# Patient Record
Sex: MALE | Race: WHITE | URBAN - METROPOLITAN AREA
[De-identification: names, ages, dates, MRNs, and addresses within clinical notes are randomized per-mention and may not be internally consistent; named-entity substitution may affect disease eponyms.]

---

## 2017-02-07 ENCOUNTER — INPATIENT (INPATIENT)
Facility: HOSPITAL | Age: 62
LOS: 0 days | Discharge: HOME | DRG: 305 | End: 2017-02-08
Attending: FAMILY MEDICINE | Admitting: FAMILY MEDICINE
Payer: COMMERCIAL

## 2017-02-07 VITALS
HEART RATE: 70 BPM | OXYGEN SATURATION: 98 % | RESPIRATION RATE: 14 BRPM | TEMPERATURE: 98 F | DIASTOLIC BLOOD PRESSURE: 100 MMHG | HEIGHT: 66 IN | WEIGHT: 147.93 LBS | SYSTOLIC BLOOD PRESSURE: 152 MMHG

## 2017-02-07 DIAGNOSIS — Z98.890 OTHER SPECIFIED POSTPROCEDURAL STATES: Chronic | ICD-10-CM

## 2017-02-07 DIAGNOSIS — Z41.8 ENCOUNTER FOR OTHER PROCEDURES FOR PURPOSES OTHER THAN REMEDYING HEALTH STATE: ICD-10-CM

## 2017-02-07 DIAGNOSIS — E11.9 TYPE 2 DIABETES MELLITUS WITHOUT COMPLICATIONS: ICD-10-CM

## 2017-02-07 DIAGNOSIS — Z96.642 PRESENCE OF LEFT ARTIFICIAL HIP JOINT: Chronic | ICD-10-CM

## 2017-02-07 DIAGNOSIS — I10 ESSENTIAL (PRIMARY) HYPERTENSION: ICD-10-CM

## 2017-02-07 DIAGNOSIS — C71.9 MALIGNANT NEOPLASM OF BRAIN, UNSPECIFIED: ICD-10-CM

## 2017-02-07 DIAGNOSIS — R56.9 UNSPECIFIED CONVULSIONS: ICD-10-CM

## 2017-02-07 DIAGNOSIS — R41.82 ALTERED MENTAL STATUS, UNSPECIFIED: ICD-10-CM

## 2017-02-07 DIAGNOSIS — D49.6 NEOPLASM OF UNSPECIFIED BEHAVIOR OF BRAIN: Chronic | ICD-10-CM

## 2017-02-07 DIAGNOSIS — K21.9 GASTRO-ESOPHAGEAL REFLUX DISEASE WITHOUT ESOPHAGITIS: ICD-10-CM

## 2017-02-07 DIAGNOSIS — K59.00 CONSTIPATION, UNSPECIFIED: ICD-10-CM

## 2017-02-07 DIAGNOSIS — E78.5 HYPERLIPIDEMIA, UNSPECIFIED: ICD-10-CM

## 2017-02-07 DIAGNOSIS — F32.9 MAJOR DEPRESSIVE DISORDER, SINGLE EPISODE, UNSPECIFIED: ICD-10-CM

## 2017-02-07 LAB
ALBUMIN SERPL ELPH-MCNC: 3.4 G/DL — SIGNIFICANT CHANGE UP (ref 3.3–5)
ALP SERPL-CCNC: 104 U/L — SIGNIFICANT CHANGE UP (ref 40–120)
ALT FLD-CCNC: 27 U/L — SIGNIFICANT CHANGE UP (ref 12–78)
ANION GAP SERPL CALC-SCNC: 7 MMOL/L — SIGNIFICANT CHANGE UP (ref 5–17)
AST SERPL-CCNC: 16 U/L — SIGNIFICANT CHANGE UP (ref 15–37)
BILIRUB SERPL-MCNC: 0.3 MG/DL — SIGNIFICANT CHANGE UP (ref 0.2–1.2)
BUN SERPL-MCNC: 17 MG/DL — SIGNIFICANT CHANGE UP (ref 7–23)
CALCIUM SERPL-MCNC: 8.5 MG/DL — SIGNIFICANT CHANGE UP (ref 8.5–10.1)
CHLORIDE SERPL-SCNC: 108 MMOL/L — SIGNIFICANT CHANGE UP (ref 96–108)
CO2 SERPL-SCNC: 28 MMOL/L — SIGNIFICANT CHANGE UP (ref 22–31)
CREAT SERPL-MCNC: 1.1 MG/DL — SIGNIFICANT CHANGE UP (ref 0.5–1.3)
GLUCOSE SERPL-MCNC: 186 MG/DL — HIGH (ref 70–99)
HCT VFR BLD CALC: 40.4 % — SIGNIFICANT CHANGE UP (ref 39–50)
HGB BLD-MCNC: 13.8 G/DL — SIGNIFICANT CHANGE UP (ref 13–17)
MCHC RBC-ENTMCNC: 30.7 PG — SIGNIFICANT CHANGE UP (ref 27–34)
MCHC RBC-ENTMCNC: 34.2 GM/DL — SIGNIFICANT CHANGE UP (ref 32–36)
MCV RBC AUTO: 89.7 FL — SIGNIFICANT CHANGE UP (ref 80–100)
PLATELET # BLD AUTO: 127 K/UL — LOW (ref 150–400)
POTASSIUM SERPL-MCNC: 4.7 MMOL/L — SIGNIFICANT CHANGE UP (ref 3.5–5.3)
POTASSIUM SERPL-SCNC: 4.7 MMOL/L — SIGNIFICANT CHANGE UP (ref 3.5–5.3)
PROT SERPL-MCNC: 6.6 G/DL — SIGNIFICANT CHANGE UP (ref 6–8.3)
RBC # BLD: 4.5 M/UL — SIGNIFICANT CHANGE UP (ref 4.2–5.8)
RBC # FLD: 13.1 % — SIGNIFICANT CHANGE UP (ref 10.3–14.5)
SODIUM SERPL-SCNC: 143 MMOL/L — SIGNIFICANT CHANGE UP (ref 135–145)
WBC # BLD: 7.5 K/UL — SIGNIFICANT CHANGE UP (ref 3.8–10.5)
WBC # FLD AUTO: 7.5 K/UL — SIGNIFICANT CHANGE UP (ref 3.8–10.5)

## 2017-02-07 PROCEDURE — 93010 ELECTROCARDIOGRAM REPORT: CPT

## 2017-02-07 PROCEDURE — 99285 EMERGENCY DEPT VISIT HI MDM: CPT

## 2017-02-07 PROCEDURE — 70450 CT HEAD/BRAIN W/O DYE: CPT | Mod: 26

## 2017-02-07 PROCEDURE — 99223 1ST HOSP IP/OBS HIGH 75: CPT | Mod: AI,GC

## 2017-02-07 RX ORDER — INSULIN LISPRO 100/ML
5 VIAL (ML) SUBCUTANEOUS
Qty: 0 | Refills: 0 | Status: DISCONTINUED | OUTPATIENT
Start: 2017-02-07 | End: 2017-02-07

## 2017-02-07 RX ORDER — INSULIN GLARGINE 100 [IU]/ML
24 INJECTION, SOLUTION SUBCUTANEOUS AT BEDTIME
Qty: 0 | Refills: 0 | Status: DISCONTINUED | OUTPATIENT
Start: 2017-02-07 | End: 2017-02-08

## 2017-02-07 RX ORDER — LEVETIRACETAM 250 MG/1
500 TABLET, FILM COATED ORAL ONCE
Qty: 0 | Refills: 0 | Status: COMPLETED | OUTPATIENT
Start: 2017-02-07 | End: 2017-02-07

## 2017-02-07 RX ORDER — DEXTROSE 50 % IN WATER 50 %
25 SYRINGE (ML) INTRAVENOUS ONCE
Qty: 0 | Refills: 0 | Status: DISCONTINUED | OUTPATIENT
Start: 2017-02-07 | End: 2017-02-08

## 2017-02-07 RX ORDER — DEXTROSE 50 % IN WATER 50 %
1 SYRINGE (ML) INTRAVENOUS ONCE
Qty: 0 | Refills: 0 | Status: DISCONTINUED | OUTPATIENT
Start: 2017-02-07 | End: 2017-02-08

## 2017-02-07 RX ORDER — LEVETIRACETAM 250 MG/1
1000 TABLET, FILM COATED ORAL
Qty: 0 | Refills: 0 | Status: DISCONTINUED | OUTPATIENT
Start: 2017-02-07 | End: 2017-02-08

## 2017-02-07 RX ORDER — DULOXETINE HYDROCHLORIDE 30 MG/1
30 CAPSULE, DELAYED RELEASE ORAL
Qty: 0 | Refills: 0 | Status: DISCONTINUED | OUTPATIENT
Start: 2017-02-07 | End: 2017-02-08

## 2017-02-07 RX ORDER — ACETAMINOPHEN 500 MG
650 TABLET ORAL EVERY 6 HOURS
Qty: 0 | Refills: 0 | Status: DISCONTINUED | OUTPATIENT
Start: 2017-02-07 | End: 2017-02-08

## 2017-02-07 RX ORDER — SIMVASTATIN 20 MG/1
40 TABLET, FILM COATED ORAL AT BEDTIME
Qty: 0 | Refills: 0 | Status: DISCONTINUED | OUTPATIENT
Start: 2017-02-07 | End: 2017-02-08

## 2017-02-07 RX ORDER — FOLIC ACID 0.8 MG
1 TABLET ORAL DAILY
Qty: 0 | Refills: 0 | Status: DISCONTINUED | OUTPATIENT
Start: 2017-02-07 | End: 2017-02-08

## 2017-02-07 RX ORDER — DOCUSATE SODIUM 100 MG
100 CAPSULE ORAL
Qty: 0 | Refills: 0 | Status: DISCONTINUED | OUTPATIENT
Start: 2017-02-07 | End: 2017-02-08

## 2017-02-07 RX ORDER — SODIUM CHLORIDE 9 MG/ML
1000 INJECTION, SOLUTION INTRAVENOUS
Qty: 0 | Refills: 0 | Status: DISCONTINUED | OUTPATIENT
Start: 2017-02-07 | End: 2017-02-08

## 2017-02-07 RX ORDER — INSULIN LISPRO 100/ML
5 VIAL (ML) SUBCUTANEOUS
Qty: 0 | Refills: 0 | Status: DISCONTINUED | OUTPATIENT
Start: 2017-02-07 | End: 2017-02-08

## 2017-02-07 RX ORDER — GLUCAGON INJECTION, SOLUTION 0.5 MG/.1ML
1 INJECTION, SOLUTION SUBCUTANEOUS ONCE
Qty: 0 | Refills: 0 | Status: DISCONTINUED | OUTPATIENT
Start: 2017-02-07 | End: 2017-02-08

## 2017-02-07 RX ORDER — DEXAMETHASONE 0.5 MG/5ML
10 ELIXIR ORAL ONCE
Qty: 0 | Refills: 0 | Status: COMPLETED | OUTPATIENT
Start: 2017-02-07 | End: 2017-02-07

## 2017-02-07 RX ORDER — DEXTROSE 50 % IN WATER 50 %
12.5 SYRINGE (ML) INTRAVENOUS ONCE
Qty: 0 | Refills: 0 | Status: DISCONTINUED | OUTPATIENT
Start: 2017-02-07 | End: 2017-02-08

## 2017-02-07 RX ORDER — DEXAMETHASONE 0.5 MG/5ML
10 ELIXIR ORAL ONCE
Qty: 0 | Refills: 0 | Status: DISCONTINUED | OUTPATIENT
Start: 2017-02-07 | End: 2017-02-07

## 2017-02-07 RX ORDER — FAMOTIDINE 10 MG/ML
20 INJECTION INTRAVENOUS
Qty: 0 | Refills: 0 | Status: DISCONTINUED | OUTPATIENT
Start: 2017-02-07 | End: 2017-02-08

## 2017-02-07 RX ORDER — INSULIN LISPRO 100/ML
5 VIAL (ML) SUBCUTANEOUS ONCE
Qty: 0 | Refills: 0 | Status: COMPLETED | OUTPATIENT
Start: 2017-02-07 | End: 2017-02-07

## 2017-02-07 RX ORDER — INSULIN LISPRO 100/ML
VIAL (ML) SUBCUTANEOUS
Qty: 0 | Refills: 0 | Status: DISCONTINUED | OUTPATIENT
Start: 2017-02-07 | End: 2017-02-08

## 2017-02-07 RX ADMIN — LEVETIRACETAM 420 MILLIGRAM(S): 250 TABLET, FILM COATED ORAL at 12:08

## 2017-02-07 RX ADMIN — Medication 102 MILLIGRAM(S): at 15:41

## 2017-02-07 RX ADMIN — LEVETIRACETAM 1000 MILLIGRAM(S): 250 TABLET, FILM COATED ORAL at 22:09

## 2017-02-07 RX ADMIN — Medication 5 UNIT(S): at 18:47

## 2017-02-07 RX ADMIN — DULOXETINE HYDROCHLORIDE 30 MILLIGRAM(S): 30 CAPSULE, DELAYED RELEASE ORAL at 22:10

## 2017-02-07 RX ADMIN — FAMOTIDINE 20 MILLIGRAM(S): 10 INJECTION INTRAVENOUS at 18:46

## 2017-02-07 RX ADMIN — INSULIN GLARGINE 24 UNIT(S): 100 INJECTION, SOLUTION SUBCUTANEOUS at 22:09

## 2017-02-07 RX ADMIN — Medication 5 UNIT(S): at 22:09

## 2017-02-07 RX ADMIN — Medication 3: at 18:47

## 2017-02-07 RX ADMIN — SIMVASTATIN 40 MILLIGRAM(S): 20 TABLET, FILM COATED ORAL at 22:10

## 2017-02-07 NOTE — DISCHARGE NOTE ADULT - NS AS DC STROKE ED MATERIALS
Stroke Education Booklet/High cholesterol is a risk factor for Strokes/Risk Factors for Stroke/Prescribed Medications/Stroke Warning Signs and Symptoms/Call 911 for Stroke/Need for Followup After Discharge High cholesterol is a risk factor for Strokes Prescribed Medications/Risk Factors for Stroke/Stroke Warning Signs and Symptoms/Stroke Education Booklet/Call 911 for Stroke/Need for Followup After Discharge/Advanced age is a risk factor for Strokes

## 2017-02-07 NOTE — ED PROVIDER NOTE - CARE PLAN
Principal Discharge DX:	Altered mental state  Goal:	prevent recurrence  Instructions for follow-up, activity and diet:	F/U head CT  Keppra 500mg once Principal Discharge DX:	Altered mental state  Goal:	prevent recurrence  Instructions for follow-up, activity and diet:	F/U head CT  Keppra 500mg once  Secondary Diagnosis:	Brain tumor, astrocytoma

## 2017-02-07 NOTE — ED ADULT NURSE NOTE - OBJECTIVE STATEMENT
61 y/o male brought in by EMS for sudden onset of AMS while at rehab center.  Pt is alert to name and place.  Lung sounds clear and bilateral. Abd no tenderness or distention x4 quads upon plap.  Pulse, motor, sensory intact x4 extremities.  Skin is intact.

## 2017-02-07 NOTE — DISCHARGE NOTE ADULT - CARE PROVIDER_API CALL
Dr. Ernie Silverio,   Phone: (   )    -  Fax: (   )    -    Dr. Motley,   Phone: (   )    -  Fax: (   )    -

## 2017-02-07 NOTE — DISCHARGE NOTE ADULT - SECONDARY DIAGNOSIS.
Hypertensive urgency Astrocytoma brain tumor IDDM (insulin dependent diabetes mellitus) Depression GERD (gastroesophageal reflux disease) Constipation

## 2017-02-07 NOTE — H&P ADULT. - PROBLEM SELECTOR PLAN 1
-CT Head findings as per above.  -Neuro consult Dr. Song placed--appreciate recs  -Cont Keppra 1g BID  -repeat CT Head in AM for any changes  -[place] [facility] [place]  -seizure precautions, aspiration precautions, fall precautions  -neuro checks q4  -Ativan PRN if acute seizures occur  -PT eval in the AM  -check Keppra level in AM  -f/u AM labs (CBC, BMP) -acute on chronic, poss vasogenic edema vs acute v chronic bleed  -CT Head findings as per above.  -Neuro consult Dr. Song placed--appreciate recs  -Cont Keppra 1g BID  -repeat CT Head in AM for any changes  -[place] [facility]  -seizure precautions, aspiration precautions, fall precautions  -neuro checks q4  -Ativan PRN if acute seizures occur  -PT eval in the AM  -check Keppra level in AM  -f/u AM labs (CBC, BMP)

## 2017-02-07 NOTE — H&P ADULT. - PROBLEM SELECTOR PLAN 8
-DVT ppx: will await final recs for neurology of possible surrounding hemorrhage per CT results. SCDs for now. Monitor thrombocytopenia.

## 2017-02-07 NOTE — ED PROVIDER NOTE - PROGRESS NOTE DETAILS
Feels back to normal per patient and family with patient now. Call placed to his oncologist Dr. Ernie Silverio at [facility]. [phone] Case d/w . Abis patient observed x 1-day in hospital

## 2017-02-07 NOTE — H&P ADULT. - NEUROLOGICAL DETAILS
responds to verbal commands/cranial nerves intact/sensation intact/responds to pain/alert and oriented x 3

## 2017-02-07 NOTE — ED PROVIDER NOTE - OBJECTIVE STATEMENT
History obtained from patient and patient's brother Zoran at bedside ([phone]).  63yo M with PMHx astrocytoma s/p resection (June 2016) and 5 rounds of chemo (last one 1/23/17) was BIBA from [place] during his PT session for AMS. According to the brother, patient was sent in due to dizziness and increased BP at the start of his PT session. Patient c/o headache which he has normally. Denies any other symptoms. As per brother, [facility] staff did not state the patient had a seizure or syncopal event.  At baseline, patient has aphasia and difficulty remembering. As per patient's brother, patient's last seizure was in July 2016. He was on steroids but they were stopped since pt was improving. Currently pt lives with his sister, Sabine.

## 2017-02-07 NOTE — H&P ADULT. - FAMILY HISTORY
Father  Still living? No  Family history of prostate cancer in father, Age at diagnosis: Age Unknown

## 2017-02-07 NOTE — DISCHARGE NOTE ADULT - PLAN OF CARE
prevent further episodes -this likely occurred due to elevated blood pressure. per neurology, this was unlikely due to the astrocytoma.   -monitor blood pressure daily and have outpatient PCP follow up if you need to be started on any medications stable -your systolic blood pressure has been in the 140-150s since arrival on the telemetry unit  -stable without any medications -CT head in the ED and repeat CT head on 2/8 were essentially unchanged. no acute infarcts or bleeds noted  -[place] [facility] [place] with [place] due on 2/14/17  -your platelet count on discharge was 139, trending up. They have been low and been trending up likely 2/2 chemotherapy. control your blood sugars to be less than 150 -follow up with PCP Dr. Motley for appropriate therapy, for now continue on current regimen  -monitor your blood sugars daily -continue home dose duloxetine -continue home dose Pepcid -continue colace as needed  -eat high fiber diet -this likely occurred due to elevated blood pressure per neurology.   -monitor blood pressure daily and have outpatient PCP follow up within 1 week. Make a log of blood pressure daily readings. Take the losartan prescribed. If your blood pressure is too low (< 100 for the higher number or less than 50 for the lower number) do not take that day's losartan dose. If having significant dizziness call your doctor. -your systolic blood pressure has improved to normal range  -monitor blood pressure daily and have outpatient PCP follow up within 1 week. Make a log of blood pressure daily readings. Take the losartan prescribed. If your blood pressure is too low (< 100 for the higher number or less than 50 for the lower number) do not take that day's losartan dose. If having significant dizziness call your doctor. -CT head in the ED and repeat CT head on 2/8 were essentially unchanged. There was a question of possible small amount of blood on CT but unclear if old and did not change on repeat imaging next day. Neurology does not believe this is of concern and you can follow up with your neurologist for the already scheduled MRI.   -follow up at [facility], with MRI due on 2/14/17  -your platelet count on discharge was 139, trending up. They have been low and been trending up possibly related to chemotherapy.

## 2017-02-07 NOTE — H&P ADULT. - ASSESSMENT
This is a 61 y/o male with a PMH of DM2, Depression, GERD, HLD, constipation and h/o pain medication dependence (off pain medications now) and brain astrocytoma (diagnosed June 2016) s/p right fronto-temporal craniotomy on 6/2016 and s/p 5 rounds of chemotherapy with Temodar and Temozolomide (last one completed 1/23/2017) BIBA from [facility] (outpatient physical therapy) for c/o of dizziness and elevated BP during his session. Pt admitted to tele unit for further monitoring of the astrocytoma with surrounding acute vs chronic hemorrhage/edema and HTN.

## 2017-02-07 NOTE — DISCHARGE NOTE ADULT - PROVIDER TOKENS
FREE:[LAST:[Dr. Ernie Silverio],PHONE:[(   )    -],FAX:[(   )    -]],FREE:[LAST:[Dr. Motley],PHONE:[(   )    -],FAX:[(   )    -]]

## 2017-02-07 NOTE — DISCHARGE NOTE ADULT - MEDICATION SUMMARY - MEDICATIONS TO TAKE
I will START or STAY ON the medications listed below when I get home from the hospital:    losartan 25 mg oral tablet  -- 1 tab(s) by mouth once a day  -- Do not take this drug if you are pregnant.  It is very important that you take or use this exactly as directed.  Do not skip doses or discontinue unless directed by your doctor.  Some non-prescription drugs may aggravate your condition.  Read all labels carefully.  If a warning appears, check with your doctor before taking.    -- Indication: For Hypertension    Keppra 1000 mg oral tablet  -- 1 tab(s) by mouth 2 times a day  -- Indication: For prevent seizures    DULoxetine 30 mg oral delayed release capsule  -- 1 cap(s) by mouth 2 times a day  -- Indication: For Antidepressant    insulin glargine 100 units/mL subcutaneous solution  -- 24 unit(s) subcutaneous once a day (at bedtime)  -- Indication: For Diabetes    insulin  -- 5 unit(s) subcutaneous 3 times a day novolog  -- Lispro  -- Indication: For Diabetes    simvastatin 40 mg oral tablet  -- 1 tab(s) by mouth once a day (at bedtime)  -- Indication: For HLD (hyperlipidemia)    Pepcid 20 mg oral tablet  -- 1 tab(s) by mouth 2 times a day  -- Indication: For Acid reflux    Colace 100 mg oral capsule  -- 1 cap(s) by mouth 2 times a day, As Needed  -- Indication: For Constipation    melatonin 3 mg oral tablet  -- 1 tab(s) by mouth once (at bedtime)  -- Indication: For Sleep aid    folic acid 1 mg oral tablet  --  by mouth once a day  -- Indication: For Vitamin I will START or STAY ON the medications listed below when I get home from the hospital:    losartan 25 mg oral tablet  -- 1 tab(s) by mouth once a day  -- Do not take this drug if you are pregnant.  It is very important that you take or use this exactly as directed.  Do not skip doses or discontinue unless directed by your doctor.  Some non-prescription drugs may aggravate your condition.  Read all labels carefully.  If a warning appears, check with your doctor before taking.    -- Indication: For Hypertension    Keppra 1000 mg oral tablet  -- 1 tab(s) by mouth 2 times a day  -- Indication: For Anti-seizure medication    DULoxetine 30 mg oral delayed release capsule  -- 1 cap(s) by mouth 2 times a day  -- Indication: For Depression    insulin glargine 100 units/mL subcutaneous solution  -- 24 unit(s) subcutaneous once a day (at bedtime)  -- Indication: For Diabetes    insulin  -- 5 unit(s) subcutaneous 3 times a day novolog  -- Lispro  -- Indication: For Diabetes    simvastatin 40 mg oral tablet  -- 1 tab(s) by mouth once a day (at bedtime)  -- Indication: For Hyperlipidemia    Pepcid 20 mg oral tablet  -- 1 tab(s) by mouth 2 times a day  -- Indication: For Acid reflux    Colace 100 mg oral capsule  -- 1 cap(s) by mouth 2 times a day, As Needed  -- Indication: For Constipation    melatonin 3 mg oral tablet  -- 1 tab(s) by mouth once (at bedtime)  -- Indication: For Sleep aid    folic acid 1 mg oral tablet  --  by mouth once a day  -- Indication: For vitamin

## 2017-02-07 NOTE — ED PROVIDER NOTE - ATTENDING CONTRIBUTION TO CARE
Male with H/O brain tumor, sent here for ALOC, now back to baseline. On exam, mild cognitive impairment baseline, normal neuro motor and sensory exam at present time. I agree with plan and management outlined by R-1

## 2017-02-07 NOTE — H&P ADULT. - ATTENDING COMMENTS
61 y/o male with a PMH of DM2, Depression, GERD, HLD, constipation and h/o pain medication dependence (off pain medications now) and brain astrocytoma (diagnosed June 2016) s/p right fronto-temporal craniotomy on 6/2016 and s/p 5 rounds of chemotherapy with Temodar and Temozolomide (last one completed 1/23/2017) BIBA from [facility] (outpatient physical therapy) for c/o of dizziness and elevated BP during his session. Pt admitted to tele unit for further monitoring of the astrocytoma with surrounding acute vs chronic hemorrhage/edema and HTN. Plan: apprec neuro recs, neuro checks, monitor for seizure like activity, keppra level, seizure precautions, cont keppra, repeat CT head tomorrow and f/u neuro recs, monitor clincial course.

## 2017-02-07 NOTE — H&P ADULT. - NEGATIVE NEUROLOGICAL SYMPTOMS
no weakness/no generalized seizures/no transient paralysis/no syncope/no headache/no vertigo/no paresthesias/no tremors

## 2017-02-07 NOTE — ED ADULT NURSE NOTE - PMH
Brain tumor    Cognitive impairment    Glioblastoma multiforme of brain    HLD (hyperlipidemia)    IDDM (insulin dependent diabetes mellitus)    Seizure

## 2017-02-07 NOTE — DISCHARGE NOTE ADULT - HOSPITAL COURSE
This is a 63 y/o male with a PMH of DM2, Depression, GERD, HLD, constipation, opioid dependence s/p cervical fusion (has been off pain meds for over 5 years) and brain astrocytoma (diagnosed June 2016) s/p right fronto-temporal craniotomy on 6/2016 and s/p 5 rounds of chemotherapy with Temodar and Temozolomide (last one completed 1/23/2017) BIBA from [facility] (outpatient physical therapy) for c/o of dizziness and elevated BP during his session. History mainly obtained by brother Zoran, at bedside. Pt states he started feeling dizzy during a PT session. This hasn't happened before. He denies any fall, LOC, or seizure activity. Pt completely aware of events. Pt denies headache at this time, fevers, chills, SOB, CP or recent sick contacts. Pt at baseline with mild cognitive impairment, aphasia and difficulty remembering 2/2 astrocytoma. Pt denies any bowel or bladder incontinence. Pt's last known seizure was June 2016, managed on Keppra daily. Pt stopped taking steriods 2 months ago for the astrocytoma. Pt [place] of [place] [facility] [place]     Neurology Dr. Song consulted in the ED-appreciate recs.  In the ED, vitals significant for BP of 175/90. Labs significant for thrombocytopenia of 127. (Pt had counts as low as 113 2/2 chemo treatment, and they have been rising slowly. Pt checks his blood work 1x/week.) In the ED, Patient was seen by physical therapy, was recommended for and is now stable for discharge to was given Decadron 10mg IV and Keppra 500mg IV. CT head showed a mass lesion on the left lentiform nucleus with surrounding acute vs chronic hemorrhage, which could represent vasogenic edema. This is a 61 y/o male with a PMH of DM2, Depression, GERD, HLD, constipation, opioid dependence s/p cervical fusion (has been off pain meds for over 5 years) and brain astrocytoma (diagnosed June 2016) s/p right fronto-temporal craniotomy on 6/2016 and s/p 5 rounds of chemotherapy with Temodar and Temozolomide (last one completed 1/23/2017) BIBA from [facility] (outpatient physical therapy) for c/o of dizziness and elevated BP during his session. History mainly obtained by brother Zoran, at bedside. Pt states he started feeling dizzy during a PT session. This hasn't happened before. He denies any fall, LOC, or seizure activity. Pt completely aware of events. Pt denies headache at this time, fevers, chills, SOB, CP or recent sick contacts. Pt at baseline with mild cognitive impairment, aphasia and difficulty remembering 2/2 astrocytoma. Pt denies any bowel or bladder incontinence. Pt's last known seizure was June 2016, managed on Keppra daily. Pt stopped taking steriods 2 months ago for the astrocytoma. Pt is due for an MRI of the Brain on 2/14/17 [place] [facility] [place].     Neurology Dr. Song consulted in the ED-appreciate recs.  In the ED, vitals significant for BP of 175/90. Mild thrombocytopenia, improving. Pt checks his blood work 1x/week.) In the ED, was given decadron, no antihypertensives. CT head showed a mass lesion on the left lentiform nucleus with surrounding acute vs chronic small amount of hemorrhage, which could represent vasogenic edema. Neurology, Dr. Song evaluated and did not deem this of acute concern and that dizziness was due to hypertensive urgency. BP improved without any meds administered. Pt's dizziness resolved and pt feels well, at his baseline. Neuro exam unimpressive, at baseline. PT recommended continuing outpt PT that pt was attending already. Repeat CT scan the next day did not show any extension of possible hemorrhage, which may be chronic finding. Neuro deemed safe for discharge and pt will f/up for MRI that he had scheduled on 2/14. Even though BP in normal range now, started on low-dose losartan as outpatient and pt will f/up his blood pressure every day to monitor for hypo or hypertension. Pt making appointment to follow up with his PMD within a week.   Pt discharged to home.     On day of discharge:  ROS: denies dizziness, headache, vision changes, neuro changes. Denies fever, chills, SOB, CP, diarrhea, dysuria.  Phys exam:  VS: 136/82; HR: 68; T: 97.8; RR: 16; 98% on RA  Gen: NAD  HEENT: mmm  CV: rrr, S1, S2  Chest: CTA b/l  Abd: BS+, soft, NT, ND  Extr: no edema    Time spent on discharge: 45min

## 2017-02-07 NOTE — DISCHARGE NOTE ADULT - PATIENT PORTAL LINK FT
“You can access the [facility] Patient Portal, offered by [facility], by registering with the following website: [email or web address]”

## 2017-02-07 NOTE — DISCHARGE NOTE ADULT - CARE PLAN
Principal Discharge DX:	Dizziness  Goal:	prevent further episodes  Instructions for follow-up, activity and diet:	-this likely occurred due to elevated blood pressure. per neurology, this was unlikely due to the astrocytoma.   -monitor blood pressure daily and have outpatient PCP follow up if you need to be started on any medications  Secondary Diagnosis:	Hypertensive urgency  Goal:	stable  Instructions for follow-up, activity and diet:	-your systolic blood pressure has been in the 140-150s since arrival on the telemetry unit  -stable without any medications  Secondary Diagnosis:	Astrocytoma brain tumor  Goal:	stable  Instructions for follow-up, activity and diet:	-CT head in the ED and repeat CT head on 2/8 were essentially unchanged. no acute infarcts or bleeds noted  [place] [facility] [place]  -your platelet count on discharge was 139, trending up. They have been low and been trending up likely 2/2 chemotherapy.  Secondary Diagnosis:	IDDM (insulin dependent diabetes mellitus)  Goal:	control your blood sugars to be less than 150  Instructions for follow-up, activity and diet:	-follow up with PCP Dr. Motley for appropriate therapy, for now continue on current regimen  -monitor your blood sugars daily  Secondary Diagnosis:	Depression  Goal:	stable  Instructions for follow-up, activity and diet:	-continue home dose duloxetine  Secondary Diagnosis:	GERD (gastroesophageal reflux disease)  Goal:	stable  Instructions for follow-up, activity and diet:	-continue home dose Pepcid  Secondary Diagnosis:	Constipation  Goal:	stable  Instructions for follow-up, activity and diet:	-continue colace as needed  -eat high fiber diet Principal Discharge DX:	Dizziness  Goal:	prevent further episodes  Instructions for follow-up, activity and diet:	-this likely occurred due to elevated blood pressure. per neurology, this was unlikely due to the astrocytoma.   -monitor blood pressure daily and have outpatient PCP follow up if you need to be started on any medications  Secondary Diagnosis:	Hypertensive urgency  Goal:	stable  Instructions for follow-up, activity and diet:	-your systolic blood pressure has been in the 140-150s since arrival on the telemetry unit  -stable without any medications  Secondary Diagnosis:	Astrocytoma brain tumor  Goal:	stable  Instructions for follow-up, activity and diet:	-CT head in the ED and repeat CT head on 2/8 were essentially unchanged. no acute infarcts or bleeds noted  -follow up at [facility] [place], with MRI due on 2/14/17  -your platelet count on discharge was 139, trending up. They have been low and been trending up likely 2/2 chemotherapy.  Secondary Diagnosis:	IDDM (insulin dependent diabetes mellitus)  Goal:	control your blood sugars to be less than 150  Instructions for follow-up, activity and diet:	-follow up with PCP Dr. Motley for appropriate therapy, for now continue on current regimen  -monitor your blood sugars daily  Secondary Diagnosis:	Depression  Goal:	stable  Instructions for follow-up, activity and diet:	-continue home dose duloxetine  Secondary Diagnosis:	GERD (gastroesophageal reflux disease)  Goal:	stable  Instructions for follow-up, activity and diet:	-continue home dose Pepcid  Secondary Diagnosis:	Constipation  Goal:	stable  Instructions for follow-up, activity and diet:	-continue colace as needed  -eat high fiber diet Principal Discharge DX:	Dizziness  Goal:	prevent further episodes  Instructions for follow-up, activity and diet:	-this likely occurred due to elevated blood pressure per neurology.   -monitor blood pressure daily and have outpatient PCP follow up within 1 week. Make a log of blood pressure daily readings. Take the losartan prescribed. If your blood pressure is too low (< 100 for the higher number or less than 50 for the lower number) do not take that day's losartan dose. If having significant dizziness call your doctor.  Secondary Diagnosis:	Hypertensive urgency  Goal:	stable  Instructions for follow-up, activity and diet:	-your systolic blood pressure has improved to normal range  -monitor blood pressure daily and have outpatient PCP follow up within 1 week. Make a log of blood pressure daily readings. Take the losartan prescribed. If your blood pressure is too low (< 100 for the higher number or less than 50 for the lower number) do not take that day's losartan dose. If having significant dizziness call your doctor.  Secondary Diagnosis:	Astrocytoma brain tumor  Instructions for follow-up, activity and diet:	-CT head in the ED and repeat CT head on 2/8 were essentially unchanged. There was a question of possible small amount of blood on CT but unclear if old and did not change on repeat imaging next day. Neurology does not believe this is of concern and you can follow up with your neurologist for the already scheduled MRI.   -follow up at [facility] [place], with MRI due on 2/14/17  -your platelet count on discharge was 139, trending up. They have been low and been trending up possibly related to chemotherapy.  Secondary Diagnosis:	IDDM (insulin dependent diabetes mellitus)  Instructions for follow-up, activity and diet:	-follow up with PCP Dr. Motley for appropriate therapy, for now continue on current regimen  -monitor your blood sugars daily  Secondary Diagnosis:	Depression  Instructions for follow-up, activity and diet:	-continue home dose duloxetine  Secondary Diagnosis:	GERD (gastroesophageal reflux disease)  Instructions for follow-up, activity and diet:	-continue home dose Pepcid  Secondary Diagnosis:	Constipation  Instructions for follow-up, activity and diet:	-continue colace as needed  -eat high fiber diet Principal Discharge DX:	Dizziness  Goal:	prevent further episodes  Instructions for follow-up, activity and diet:	-this likely occurred due to elevated blood pressure per neurology.   -monitor blood pressure daily and have outpatient PCP follow up within 1 week. Make a log of blood pressure daily readings. Take the losartan prescribed. If your blood pressure is too low (< 100 for the higher number or less than 50 for the lower number) do not take that day's losartan dose. If having significant dizziness call your doctor.  Secondary Diagnosis:	Hypertensive urgency  Goal:	stable  Instructions for follow-up, activity and diet:	-your systolic blood pressure has improved to normal range  -monitor blood pressure daily and have outpatient PCP follow up within 1 week. Make a log of blood pressure daily readings. Take the losartan prescribed. If your blood pressure is too low (< 100 for the higher number or less than 50 for the lower number) do not take that day's losartan dose. If having significant dizziness call your doctor.  Secondary Diagnosis:	Astrocytoma brain tumor  Instructions for follow-up, activity and diet:	-CT head in the ED and repeat CT head on 2/8 were essentially unchanged. There was a question of possible small amount of blood on CT but unclear if old and did not change on repeat imaging next day. Neurology does not believe this is of concern and you can follow up with your neurologist for the already scheduled MRI.   -follow up at [facility] [place], with MRI due on 2/14/17  -your platelet count on discharge was 139, trending up. They have been low and been trending up possibly related to chemotherapy.  Secondary Diagnosis:	IDDM (insulin dependent diabetes mellitus)  Instructions for follow-up, activity and diet:	-follow up with PCP Dr. Motley for appropriate therapy, for now continue on current regimen  -monitor your blood sugars daily  Secondary Diagnosis:	Depression  Instructions for follow-up, activity and diet:	-continue home dose duloxetine  Secondary Diagnosis:	GERD (gastroesophageal reflux disease)  Instructions for follow-up, activity and diet:	-continue home dose Pepcid  Secondary Diagnosis:	Constipation  Instructions for follow-up, activity and diet:	-continue colace as needed  -eat high fiber diet Principal Discharge DX:	Dizziness  Goal:	prevent further episodes  Instructions for follow-up, activity and diet:	-this likely occurred due to elevated blood pressure per neurology.   -monitor blood pressure daily and have outpatient PCP follow up within 1 week. Make a log of blood pressure daily readings. Take the losartan prescribed. If your blood pressure is too low (< 100 for the higher number or less than 50 for the lower number) do not take that day's losartan dose. If having significant dizziness call your doctor.  Secondary Diagnosis:	Hypertensive urgency  Goal:	stable  Instructions for follow-up, activity and diet:	-your systolic blood pressure has improved to normal range  -monitor blood pressure daily and have outpatient PCP follow up within 1 week. Make a log of blood pressure daily readings. Take the losartan prescribed. If your blood pressure is too low (< 100 for the higher number or less than 50 for the lower number) do not take that day's losartan dose. If having significant dizziness call your doctor.  Secondary Diagnosis:	Astrocytoma brain tumor  Instructions for follow-up, activity and diet:	-CT head in the ED and repeat CT head on 2/8 were essentially unchanged. There was a question of possible small amount of blood on CT but unclear if old and did not change on repeat imaging next day. Neurology does not believe this is of concern and you can follow up with your neurologist for the already scheduled MRI.   -follow up at [facility] [place], with MRI due on 2/14/17  -your platelet count on discharge was 139, trending up. They have been low and been trending up possibly related to chemotherapy.  Secondary Diagnosis:	IDDM (insulin dependent diabetes mellitus)  Instructions for follow-up, activity and diet:	-follow up with PCP Dr. Motley for appropriate therapy, for now continue on current regimen  -monitor your blood sugars daily  Secondary Diagnosis:	Depression  Instructions for follow-up, activity and diet:	-continue home dose duloxetine  Secondary Diagnosis:	GERD (gastroesophageal reflux disease)  Instructions for follow-up, activity and diet:	-continue home dose Pepcid  Secondary Diagnosis:	Constipation  Instructions for follow-up, activity and diet:	-continue colace as needed  -eat high fiber diet

## 2017-02-07 NOTE — H&P ADULT. - SUBSTANCE USE COMMENT, PROFILE
history of dependence on pain medications s/p cervical fusion surgery, s/p methadone clinic and off pain meds for over 5 years

## 2017-02-07 NOTE — H&P ADULT. - PROBLEM SELECTOR PLAN 3
-Cont Humalog 5u TID  -Cont HISS  -routine accuchecks  -DM Diet and hypoglycemia protocol -Cont Humalog 5u TID  -Cont HISS  -routine accuchecks  -DM Diet and hypoglycemia protocol  -f/u AM HbA1c

## 2017-02-07 NOTE — ED PROVIDER NOTE - PMH
Glioblastoma multiforme of brain    HLD (hyperlipidemia)    IDDM (insulin dependent diabetes mellitus)

## 2017-02-07 NOTE — H&P ADULT. - HISTORY OF PRESENT ILLNESS
This is a 61 y/o male with a PMH of DM2, Depression, GERD, HLD, constipation and brain astrocytoma (diagnosed June 2016) s/p right fronto-temporal craniotomy on 6/2016 and s/p 5 rounds of chemotherapy with Temodar and Temozolomide (last one completed 1/23/2017) BIBA from [facility] (outpatient physical therapy) for c/o of dizziness and elevated BP during his session. History mainly obtained by brother Zoran, at bedside. Pt states he started feeling dizzy during a PT session. This hasn't happened before. He denies any fall, LOC, or seizure activity. Pt completely aware of events. Pt denies headache at this time, fevers, chills, SOB, CP or recent sick contacts. Pt at baseline with mild cognitive impairment, aphasia and difficulty remembering 2/2 astrocytoma. Pt denies any bowel or bladder incontinence. Pt's last known seizure was June 2016, managed on Keppra daily. Pt stopped taking steriods 2 months ago for the astrocytoma. Pt is due for an MRI of the Brain on 2/14/17 at [facility] [place].     Neurology Dr. Song consulted in the ED-appreciate recs.  In the ED, vitals significant for BP of 175/90. Labs significant for thrombocytopenia of 127. (Pt had counts as low as 113 2/2 chemo treatment, and they have been rising slowly. Pt checks his blood work 1x/week.) In the ED, Patient was seen by physical therapy, was recommended for and is now stable for discharge to was given Decadron 10mg IV and Keppra 500mg IV. CT head showed a mass lesion on the left lentiform nucleus with surrounding acute vs chronic hemorrhage, which could represent vasogenic edema. This is a 63 y/o male with a PMH of DM2, Depression, GERD, HLD, constipation, opioid dependence s/p cervical fusion (has been off pain meds for over 5 years) and brain astrocytoma (diagnosed June 2016) s/p right fronto-temporal craniotomy on 6/2016 and s/p 5 rounds of chemotherapy with Temodar and Temozolomide (last one completed 1/23/2017) BIBA from [facility] (outpatient physical therapy) for c/o of dizziness and elevated BP during his session. History mainly obtained by brother Zoran, at bedside. Pt states he started feeling dizzy during a PT session. This hasn't happened before. He denies any fall, LOC, or seizure activity. Pt completely aware of events. Pt denies headache at this time, fevers, chills, SOB, CP or recent sick contacts. Pt at baseline with mild cognitive impairment, aphasia and difficulty remembering 2/2 astrocytoma. Pt denies any bowel or bladder incontinence. Pt's last known seizure was June 2016, managed on Keppra daily. Pt stopped taking steriods 2 months ago for the astrocytoma. Pt is due for an MRI of the Brain on 2/14/17 at [facility] [place].     Neurology Dr. Song consulted in the ED-appreciate recs.  In the ED, vitals significant for BP of 175/90. Labs significant for thrombocytopenia of 127. (Pt had counts as low as 113 2/2 chemo treatment, and they have been rising slowly. Pt checks his blood work 1x/week.) In the ED, Patient was seen by physical therapy, was recommended for and is now stable for discharge to was given Decadron 10mg IV and Keppra 500mg IV. CT head showed a mass lesion on the left lentiform nucleus with surrounding acute vs chronic hemorrhage, which could represent vasogenic edema.

## 2017-02-07 NOTE — DISCHARGE NOTE ADULT - ADDITIONAL INSTRUCTIONS
f/u with Dr. Ernie Silverio at [facility] [place] for MRI on 2/14/17.   keep checking CBC/BMP weekly  f/u with Dr. Motley PCP for blood sugar control, or if your blood pressure gets >150/90

## 2017-02-07 NOTE — PATIENT PROFILE ADULT. - PMH
Astrocytoma brain tumor    Brain tumor    Cognitive impairment    Constipation    Depression    GERD (gastroesophageal reflux disease)    HLD (hyperlipidemia)    IDDM (insulin dependent diabetes mellitus)    Seizure

## 2017-02-08 VITALS
DIASTOLIC BLOOD PRESSURE: 82 MMHG | HEART RATE: 68 BPM | TEMPERATURE: 98 F | RESPIRATION RATE: 16 BRPM | SYSTOLIC BLOOD PRESSURE: 136 MMHG | OXYGEN SATURATION: 98 %

## 2017-02-08 LAB
ANION GAP SERPL CALC-SCNC: 8 MMOL/L — SIGNIFICANT CHANGE UP (ref 5–17)
BUN SERPL-MCNC: 21 MG/DL — SIGNIFICANT CHANGE UP (ref 7–23)
CALCIUM SERPL-MCNC: 8.8 MG/DL — SIGNIFICANT CHANGE UP (ref 8.5–10.1)
CHLORIDE SERPL-SCNC: 108 MMOL/L — SIGNIFICANT CHANGE UP (ref 96–108)
CO2 SERPL-SCNC: 28 MMOL/L — SIGNIFICANT CHANGE UP (ref 22–31)
CREAT SERPL-MCNC: 1.1 MG/DL — SIGNIFICANT CHANGE UP (ref 0.5–1.3)
GLUCOSE SERPL-MCNC: 181 MG/DL — HIGH (ref 70–99)
HBA1C BLD-MCNC: 7.1 % — HIGH (ref 4–5.6)
HCT VFR BLD CALC: 40.6 % — SIGNIFICANT CHANGE UP (ref 39–50)
HGB BLD-MCNC: 13.6 G/DL — SIGNIFICANT CHANGE UP (ref 13–17)
MAGNESIUM SERPL-MCNC: 2.2 MG/DL — SIGNIFICANT CHANGE UP (ref 1.8–2.4)
MCHC RBC-ENTMCNC: 30.1 PG — SIGNIFICANT CHANGE UP (ref 27–34)
MCHC RBC-ENTMCNC: 33.6 GM/DL — SIGNIFICANT CHANGE UP (ref 32–36)
MCV RBC AUTO: 89.8 FL — SIGNIFICANT CHANGE UP (ref 80–100)
PHOSPHATE SERPL-MCNC: 3.1 MG/DL — SIGNIFICANT CHANGE UP (ref 2.5–4.5)
PLATELET # BLD AUTO: 139 K/UL — LOW (ref 150–400)
POTASSIUM SERPL-MCNC: 3.7 MMOL/L — SIGNIFICANT CHANGE UP (ref 3.5–5.3)
POTASSIUM SERPL-SCNC: 3.7 MMOL/L — SIGNIFICANT CHANGE UP (ref 3.5–5.3)
RBC # BLD: 4.52 M/UL — SIGNIFICANT CHANGE UP (ref 4.2–5.8)
RBC # FLD: 12.8 % — SIGNIFICANT CHANGE UP (ref 10.3–14.5)
SODIUM SERPL-SCNC: 144 MMOL/L — SIGNIFICANT CHANGE UP (ref 135–145)
WBC # BLD: 9 K/UL — SIGNIFICANT CHANGE UP (ref 3.8–10.5)
WBC # FLD AUTO: 9 K/UL — SIGNIFICANT CHANGE UP (ref 3.8–10.5)

## 2017-02-08 PROCEDURE — 96375 TX/PRO/DX INJ NEW DRUG ADDON: CPT

## 2017-02-08 PROCEDURE — 80177 DRUG SCRN QUAN LEVETIRACETAM: CPT

## 2017-02-08 PROCEDURE — 83735 ASSAY OF MAGNESIUM: CPT

## 2017-02-08 PROCEDURE — 97162 PT EVAL MOD COMPLEX 30 MIN: CPT

## 2017-02-08 PROCEDURE — 99285 EMERGENCY DEPT VISIT HI MDM: CPT | Mod: 25

## 2017-02-08 PROCEDURE — 70450 CT HEAD/BRAIN W/O DYE: CPT

## 2017-02-08 PROCEDURE — 83036 HEMOGLOBIN GLYCOSYLATED A1C: CPT

## 2017-02-08 PROCEDURE — 85027 COMPLETE CBC AUTOMATED: CPT

## 2017-02-08 PROCEDURE — 96372 THER/PROPH/DIAG INJ SC/IM: CPT | Mod: 59

## 2017-02-08 PROCEDURE — 84100 ASSAY OF PHOSPHORUS: CPT

## 2017-02-08 PROCEDURE — 99239 HOSP IP/OBS DSCHRG MGMT >30: CPT

## 2017-02-08 PROCEDURE — 80053 COMPREHEN METABOLIC PANEL: CPT

## 2017-02-08 PROCEDURE — G8978: CPT

## 2017-02-08 PROCEDURE — 70450 CT HEAD/BRAIN W/O DYE: CPT | Mod: 26

## 2017-02-08 PROCEDURE — G8979: CPT

## 2017-02-08 PROCEDURE — 80048 BASIC METABOLIC PNL TOTAL CA: CPT

## 2017-02-08 PROCEDURE — 96365 THER/PROPH/DIAG IV INF INIT: CPT

## 2017-02-08 PROCEDURE — 93005 ELECTROCARDIOGRAM TRACING: CPT

## 2017-02-08 PROCEDURE — G8980: CPT

## 2017-02-08 RX ORDER — LOSARTAN POTASSIUM 100 MG/1
1 TABLET, FILM COATED ORAL
Qty: 14 | Refills: 0 | OUTPATIENT
Start: 2017-02-08 | End: 2017-02-22

## 2017-02-08 RX ADMIN — Medication 1 MILLIGRAM(S): at 12:26

## 2017-02-08 RX ADMIN — Medication 3: at 12:26

## 2017-02-08 RX ADMIN — Medication 1: at 09:12

## 2017-02-08 RX ADMIN — FAMOTIDINE 20 MILLIGRAM(S): 10 INJECTION INTRAVENOUS at 05:25

## 2017-02-08 RX ADMIN — Medication 5 UNIT(S): at 12:26

## 2017-02-08 RX ADMIN — LEVETIRACETAM 1000 MILLIGRAM(S): 250 TABLET, FILM COATED ORAL at 05:25

## 2017-02-08 RX ADMIN — DULOXETINE HYDROCHLORIDE 30 MILLIGRAM(S): 30 CAPSULE, DELAYED RELEASE ORAL at 05:25

## 2017-02-08 RX ADMIN — Medication 5 UNIT(S): at 09:12

## 2017-02-08 NOTE — PHYSICAL THERAPY INITIAL EVALUATION ADULT - GENERAL OBSERVATIONS, REHAB EVAL
Pt received in bed resting in tele. Tele monitors and pulse ox intact. Pt noted to have expressive aphasia. Pt was agreeable /c PT eval.

## 2017-02-08 NOTE — PHYSICAL THERAPY INITIAL EVALUATION ADULT - DID THE PATIENT HAVE SURGERY?
CT Head: (+) mass lesion central on left lentiform nucleus /c associated focal  or acute or chronic hemorrhage /c surrounding abnormalities./n/a

## 2017-02-08 NOTE — PHYSICAL THERAPY INITIAL EVALUATION ADULT - ANKLE STRATEGY ASSESSMENT, REHAB EVAL
BP in sitting up at /94, SaO2 97% room air and HR: 101 bpm. After ambulaton BP: 141/87 SaO2: 98% room air and HR: 111 bpm/ NAD but did c/o mild dizziness during PT eval.

## 2017-02-08 NOTE — PHYSICAL THERAPY INITIAL EVALUATION ADULT - TRANSFER SAFETY CONCERNS NOTED: SIT/STAND, REHAB EVAL
decreased safety awareness/decreased sequencing ability/decreased balance during turns/decreased proprioception

## 2017-02-08 NOTE — PHYSICAL THERAPY INITIAL EVALUATION ADULT - IMPAIRMENTS CONTRIBUTING TO GAIT DEVIATIONS, PT EVAL
cognition/impaired coordination/impaired motor control/impaired postural control/impaired balance/decreased strength/LLE, sways to left side

## 2017-02-08 NOTE — PHYSICAL THERAPY INITIAL EVALUATION ADULT - PERTINENT HX OF CURRENT PROBLEM, REHAB EVAL
As per H&P:This is a 63 y/o male with a PMH of DM2, Depression, GERD, HLD, constipation, opioid dependence s/p cervical fusion (has been off pain meds for over 5 years) and brain astrocytoma (diagnosed June 2016) s/p right fronto-temporal craniotomy on 6/2016 and s/p 5 rounds of chemotherapy /c Temodar & Temozolomide (last one completed 1/23/2017) BIBA from [facility] (outpatient physical therapy) for c/o of dizziness & elevated BP during his session. Pt /c history of expressive aphasia'

## 2017-02-08 NOTE — PHYSICAL THERAPY INITIAL EVALUATION ADULT - ADDITIONAL COMMENTS
History slightly unclear and vague due to pt cognition and expressive aphasia. Pt reports currently living /c his brother and sister in an apartment /c 2-4 KORINA and bilateral handrails. Pt denies having steps inside. Pt reports he is independent /c functional mobility and ADLs. Pt uses cane, denies owning a rolling walker and any other adaptive or assistive devices. Pt does not drive and attends Out-patient PT.

## 2017-02-08 NOTE — PHYSICAL THERAPY INITIAL EVALUATION ADULT - GAIT DEVIATIONS NOTED, PT EVAL
decreased step length/decreased velocity of limb motion/decreased stride length/steppage gait /c LLE/decreased weight-shifting ability

## 2017-02-08 NOTE — PHYSICAL THERAPY INITIAL EVALUATION ADULT - IMPAIRMENTS FOUND, PT EVAL
muscle strength/poor safety awareness/fine motor/decreased midline orientation/cognitive impairment/Stairs/gait, locomotion, and balance

## 2017-02-08 NOTE — PHYSICAL THERAPY INITIAL EVALUATION ADULT - PLANNED THERAPY INTERVENTIONS, PT EVAL
strengthening/transfer training/Assess stairs in 1-3 sessions/balance training/bed mobility training/gait training

## 2017-02-09 LAB — LEVETIRACETAM SERPL-MCNC: 73.3 MCG/ML — HIGH (ref 12–46)

## 2017-02-10 DIAGNOSIS — K59.00 CONSTIPATION, UNSPECIFIED: ICD-10-CM

## 2017-02-10 DIAGNOSIS — I67.4 HYPERTENSIVE ENCEPHALOPATHY: ICD-10-CM

## 2017-02-10 DIAGNOSIS — E11.9 TYPE 2 DIABETES MELLITUS WITHOUT COMPLICATIONS: ICD-10-CM

## 2017-02-10 DIAGNOSIS — Z92.21 PERSONAL HISTORY OF ANTINEOPLASTIC CHEMOTHERAPY: ICD-10-CM

## 2017-02-10 DIAGNOSIS — I16.0 HYPERTENSIVE URGENCY: ICD-10-CM

## 2017-02-10 DIAGNOSIS — Z79.4 LONG TERM (CURRENT) USE OF INSULIN: ICD-10-CM

## 2017-02-10 DIAGNOSIS — K21.9 GASTRO-ESOPHAGEAL REFLUX DISEASE WITHOUT ESOPHAGITIS: ICD-10-CM

## 2017-02-10 DIAGNOSIS — Z87.891 PERSONAL HISTORY OF NICOTINE DEPENDENCE: ICD-10-CM

## 2017-02-10 DIAGNOSIS — E78.5 HYPERLIPIDEMIA, UNSPECIFIED: ICD-10-CM

## 2017-02-10 DIAGNOSIS — G40.909 EPILEPSY, UNSPECIFIED, NOT INTRACTABLE, WITHOUT STATUS EPILEPTICUS: ICD-10-CM

## 2017-02-10 DIAGNOSIS — C71.9 MALIGNANT NEOPLASM OF BRAIN, UNSPECIFIED: ICD-10-CM

## 2017-02-18 ENCOUNTER — INPATIENT (INPATIENT)
Facility: HOSPITAL | Age: 62
LOS: 1 days | Discharge: HOME | End: 2017-02-20
Attending: INTERNAL MEDICINE | Admitting: INTERNAL MEDICINE
Payer: MEDICARE

## 2017-02-18 VITALS
RESPIRATION RATE: 16 BRPM | HEART RATE: 108 BPM | SYSTOLIC BLOOD PRESSURE: 145 MMHG | OXYGEN SATURATION: 100 % | DIASTOLIC BLOOD PRESSURE: 101 MMHG | TEMPERATURE: 98 F

## 2017-02-18 DIAGNOSIS — Z96.642 PRESENCE OF LEFT ARTIFICIAL HIP JOINT: Chronic | ICD-10-CM

## 2017-02-18 DIAGNOSIS — D49.6 NEOPLASM OF UNSPECIFIED BEHAVIOR OF BRAIN: Chronic | ICD-10-CM

## 2017-02-18 DIAGNOSIS — Z98.890 OTHER SPECIFIED POSTPROCEDURAL STATES: Chronic | ICD-10-CM

## 2017-02-18 LAB
ALBUMIN SERPL ELPH-MCNC: 3.5 G/DL — SIGNIFICANT CHANGE UP (ref 3.3–5)
ALP SERPL-CCNC: 102 U/L — SIGNIFICANT CHANGE UP (ref 40–120)
ALT FLD-CCNC: 64 U/L — SIGNIFICANT CHANGE UP (ref 12–78)
ANION GAP SERPL CALC-SCNC: 10 MMOL/L — SIGNIFICANT CHANGE UP (ref 5–17)
APTT BLD: 31.6 SEC — SIGNIFICANT CHANGE UP (ref 27.5–37.4)
AST SERPL-CCNC: 29 U/L — SIGNIFICANT CHANGE UP (ref 15–37)
BASOPHILS # BLD AUTO: 0.1 K/UL — SIGNIFICANT CHANGE UP (ref 0–0.2)
BASOPHILS NFR BLD AUTO: 0.5 % — SIGNIFICANT CHANGE UP (ref 0–2)
BILIRUB SERPL-MCNC: 0.3 MG/DL — SIGNIFICANT CHANGE UP (ref 0.2–1.2)
BUN SERPL-MCNC: 26 MG/DL — HIGH (ref 7–23)
CALCIUM SERPL-MCNC: 8.4 MG/DL — LOW (ref 8.5–10.1)
CHLORIDE SERPL-SCNC: 105 MMOL/L — SIGNIFICANT CHANGE UP (ref 96–108)
CO2 SERPL-SCNC: 26 MMOL/L — SIGNIFICANT CHANGE UP (ref 22–31)
CREAT SERPL-MCNC: 1.16 MG/DL — SIGNIFICANT CHANGE UP (ref 0.5–1.3)
EOSINOPHIL # BLD AUTO: 0 K/UL — SIGNIFICANT CHANGE UP (ref 0–0.5)
EOSINOPHIL NFR BLD AUTO: 0.1 % — SIGNIFICANT CHANGE UP (ref 0–6)
GLUCOSE SERPL-MCNC: 243 MG/DL — HIGH (ref 70–99)
HCT VFR BLD CALC: 41.8 % — SIGNIFICANT CHANGE UP (ref 39–50)
HGB BLD-MCNC: 14.8 G/DL — SIGNIFICANT CHANGE UP (ref 13–17)
INR BLD: 1.06 RATIO — SIGNIFICANT CHANGE UP (ref 0.88–1.16)
LYMPHOCYTES # BLD AUTO: 0.8 K/UL — LOW (ref 1–3.3)
LYMPHOCYTES # BLD AUTO: 6.4 % — LOW (ref 13–44)
MCHC RBC-ENTMCNC: 31.1 PG — SIGNIFICANT CHANGE UP (ref 27–34)
MCHC RBC-ENTMCNC: 35.3 GM/DL — SIGNIFICANT CHANGE UP (ref 32–36)
MCV RBC AUTO: 88.1 FL — SIGNIFICANT CHANGE UP (ref 80–100)
MONOCYTES # BLD AUTO: 0.6 K/UL — SIGNIFICANT CHANGE UP (ref 0–0.9)
MONOCYTES NFR BLD AUTO: 4.5 % — SIGNIFICANT CHANGE UP (ref 2–14)
NEUTROPHILS # BLD AUTO: 11.1 K/UL — HIGH (ref 1.8–7.4)
NEUTROPHILS NFR BLD AUTO: 88.5 % — HIGH (ref 43–77)
PLATELET # BLD AUTO: 139 K/UL — LOW (ref 150–400)
POTASSIUM SERPL-MCNC: 3.5 MMOL/L — SIGNIFICANT CHANGE UP (ref 3.5–5.3)
POTASSIUM SERPL-SCNC: 3.5 MMOL/L — SIGNIFICANT CHANGE UP (ref 3.5–5.3)
PROT SERPL-MCNC: 6.6 GM/DL — SIGNIFICANT CHANGE UP (ref 6–8.3)
PROTHROM AB SERPL-ACNC: 11.7 SEC — SIGNIFICANT CHANGE UP (ref 10–13.1)
RBC # BLD: 4.74 M/UL — SIGNIFICANT CHANGE UP (ref 4.2–5.8)
RBC # FLD: 12.5 % — SIGNIFICANT CHANGE UP (ref 10.3–14.5)
SODIUM SERPL-SCNC: 141 MMOL/L — SIGNIFICANT CHANGE UP (ref 135–145)
TROPONIN I SERPL-MCNC: <0.015 NG/ML — SIGNIFICANT CHANGE UP (ref 0.01–0.04)
WBC # BLD: 12.6 K/UL — HIGH (ref 3.8–10.5)
WBC # FLD AUTO: 12.6 K/UL — HIGH (ref 3.8–10.5)

## 2017-02-18 PROCEDURE — 99285 EMERGENCY DEPT VISIT HI MDM: CPT

## 2017-02-18 PROCEDURE — 70450 CT HEAD/BRAIN W/O DYE: CPT | Mod: 26

## 2017-02-18 PROCEDURE — 93010 ELECTROCARDIOGRAM REPORT: CPT

## 2017-02-18 RX ORDER — GLUCAGON INJECTION, SOLUTION 0.5 MG/.1ML
1 INJECTION, SOLUTION SUBCUTANEOUS ONCE
Qty: 0 | Refills: 0 | Status: DISCONTINUED | OUTPATIENT
Start: 2017-02-18 | End: 2017-02-20

## 2017-02-18 RX ORDER — DEXTROSE 50 % IN WATER 50 %
25 SYRINGE (ML) INTRAVENOUS ONCE
Qty: 0 | Refills: 0 | Status: DISCONTINUED | OUTPATIENT
Start: 2017-02-18 | End: 2017-02-20

## 2017-02-18 RX ORDER — FOLIC ACID 0.8 MG
1 TABLET ORAL DAILY
Qty: 0 | Refills: 0 | Status: DISCONTINUED | OUTPATIENT
Start: 2017-02-18 | End: 2017-02-20

## 2017-02-18 RX ORDER — AMLODIPINE BESYLATE 2.5 MG/1
10 TABLET ORAL DAILY
Qty: 0 | Refills: 0 | Status: DISCONTINUED | OUTPATIENT
Start: 2017-02-18 | End: 2017-02-20

## 2017-02-18 RX ORDER — SODIUM CHLORIDE 9 MG/ML
1000 INJECTION, SOLUTION INTRAVENOUS
Qty: 0 | Refills: 0 | Status: DISCONTINUED | OUTPATIENT
Start: 2017-02-18 | End: 2017-02-20

## 2017-02-18 RX ORDER — ASPIRIN/CALCIUM CARB/MAGNESIUM 324 MG
325 TABLET ORAL ONCE
Qty: 0 | Refills: 0 | Status: COMPLETED | OUTPATIENT
Start: 2017-02-18 | End: 2017-02-18

## 2017-02-18 RX ORDER — FAMOTIDINE 10 MG/ML
20 INJECTION INTRAVENOUS
Qty: 0 | Refills: 0 | Status: DISCONTINUED | OUTPATIENT
Start: 2017-02-18 | End: 2017-02-20

## 2017-02-18 RX ORDER — INSULIN LISPRO 100/ML
VIAL (ML) SUBCUTANEOUS
Qty: 0 | Refills: 0 | Status: DISCONTINUED | OUTPATIENT
Start: 2017-02-18 | End: 2017-02-20

## 2017-02-18 RX ORDER — ASPIRIN/CALCIUM CARB/MAGNESIUM 324 MG
325 TABLET ORAL DAILY
Qty: 0 | Refills: 0 | Status: DISCONTINUED | OUTPATIENT
Start: 2017-02-18 | End: 2017-02-20

## 2017-02-18 RX ORDER — LABETALOL HCL 100 MG
10 TABLET ORAL ONCE
Qty: 0 | Refills: 0 | Status: DISCONTINUED | OUTPATIENT
Start: 2017-02-18 | End: 2017-02-18

## 2017-02-18 RX ORDER — LEVETIRACETAM 250 MG/1
1000 TABLET, FILM COATED ORAL
Qty: 0 | Refills: 0 | Status: DISCONTINUED | OUTPATIENT
Start: 2017-02-18 | End: 2017-02-20

## 2017-02-18 RX ORDER — ATORVASTATIN CALCIUM 80 MG/1
40 TABLET, FILM COATED ORAL AT BEDTIME
Qty: 0 | Refills: 0 | Status: DISCONTINUED | OUTPATIENT
Start: 2017-02-18 | End: 2017-02-20

## 2017-02-18 RX ORDER — DEXTROSE 50 % IN WATER 50 %
12.5 SYRINGE (ML) INTRAVENOUS ONCE
Qty: 0 | Refills: 0 | Status: DISCONTINUED | OUTPATIENT
Start: 2017-02-18 | End: 2017-02-20

## 2017-02-18 RX ORDER — DEXTROSE 50 % IN WATER 50 %
1 SYRINGE (ML) INTRAVENOUS ONCE
Qty: 0 | Refills: 0 | Status: DISCONTINUED | OUTPATIENT
Start: 2017-02-18 | End: 2017-02-20

## 2017-02-18 RX ORDER — DULOXETINE HYDROCHLORIDE 30 MG/1
60 CAPSULE, DELAYED RELEASE ORAL DAILY
Qty: 0 | Refills: 0 | Status: DISCONTINUED | OUTPATIENT
Start: 2017-02-18 | End: 2017-02-20

## 2017-02-18 RX ORDER — DOCUSATE SODIUM 100 MG
100 CAPSULE ORAL DAILY
Qty: 0 | Refills: 0 | Status: DISCONTINUED | OUTPATIENT
Start: 2017-02-18 | End: 2017-02-20

## 2017-02-18 RX ORDER — INSULIN GLARGINE 100 [IU]/ML
24 INJECTION, SOLUTION SUBCUTANEOUS AT BEDTIME
Qty: 0 | Refills: 0 | Status: DISCONTINUED | OUTPATIENT
Start: 2017-02-18 | End: 2017-02-20

## 2017-02-18 RX ADMIN — INSULIN GLARGINE 24 UNIT(S): 100 INJECTION, SOLUTION SUBCUTANEOUS at 22:40

## 2017-02-18 RX ADMIN — Medication 325 MILLIGRAM(S): at 16:10

## 2017-02-18 RX ADMIN — Medication 1 MILLIGRAM(S): at 22:42

## 2017-02-18 RX ADMIN — Medication 325 MILLIGRAM(S): at 22:43

## 2017-02-18 RX ADMIN — FAMOTIDINE 20 MILLIGRAM(S): 10 INJECTION INTRAVENOUS at 22:42

## 2017-02-18 RX ADMIN — AMLODIPINE BESYLATE 10 MILLIGRAM(S): 2.5 TABLET ORAL at 18:28

## 2017-02-18 RX ADMIN — ATORVASTATIN CALCIUM 40 MILLIGRAM(S): 80 TABLET, FILM COATED ORAL at 22:42

## 2017-02-18 RX ADMIN — LEVETIRACETAM 1000 MILLIGRAM(S): 250 TABLET, FILM COATED ORAL at 22:40

## 2017-02-18 RX ADMIN — Medication 100 MILLIGRAM(S): at 22:42

## 2017-02-18 RX ADMIN — DULOXETINE HYDROCHLORIDE 60 MILLIGRAM(S): 30 CAPSULE, DELAYED RELEASE ORAL at 22:42

## 2017-02-18 NOTE — ED PROVIDER NOTE - NS ED MD SCRIBE ATTENDING SCRIBE SECTIONS
DISPOSITION/PAST MEDICAL/SURGICAL/SOCIAL HISTORY/PROGRESS NOTE/REVIEW OF SYSTEMS/RESULTS/PHYSICAL EXAM/HISTORY OF PRESENT ILLNESS

## 2017-02-18 NOTE — H&P ADULT - ASSESSMENT
63yo male a/w TIA    # TIA/CVA  - currently afebrile, HD stable, comfortable on room air  - on neuro exam motor strength 5/5 all ext, no facial asymmetry, slightly slurred speech, resolving  - CTH noted  - Obtain MRI/MRA  - ECHO  - Neuro Eval  - Check Lipid panel  - Check HgbA1C  - Patient passed dysphagia screen  - BP control    # DM  - Lantus  - Sliding scale    # HTN  - Norvasc 10mg daily    # DVT ppx    # Admit, stable

## 2017-02-18 NOTE — H&P ADULT - NSHPREVIEWOFSYSTEMS_GEN_ALL_CORE
(-)Fever, chills, cough, chest pain, sob, headache, dizziness, palpitations, abd pain, n/v/d, leg swelling  (+) slurred speech, right sided weakness

## 2017-02-18 NOTE — PATIENT PROFILE ADULT. - PMH
Brain tumor    Diabetes mellitus of other type with hyperosmolarity, with long-term current use of insulin    Essential hypertension    Glioblastoma

## 2017-02-18 NOTE — H&P ADULT - HISTORY OF PRESENT ILLNESS
Pt is 63yo male with PMHx of DM, HTN, Opiod dependence, glioblastoma s/p resection at [facility] 2016, former smoker, presents with sudden onset of slurred speech, gait instability and right sided weakness. Symptoms lasted about 10-15 minutes, started to subside when EMS arrived. Pt denies syncope, falls, LOC, chest pain, sob, palpitations, med non compliance. No other constitutional symptoms. CUrrently reports almost at baseline, with marked improvement in speech, and right sided strength.

## 2017-02-18 NOTE — H&P ADULT - NSHPPHYSICALEXAM_GEN_ALL_CORE
Gen: AAOx2, person and place, NAD  HEENT: NCAT, EOMI  Neck: Supple  CV: nml S1S2, RRR  Lungs: CTABL  Abd: Soft, NT, ND, BS+  Ext: No edema  Neuro: CN 2-12 intact, slightly slurred speech, no facial asymmetry, Motor 5/5 all ext, sensory intact

## 2017-02-18 NOTE — ED ADULT NURSE NOTE - CHPI ED SYMPTOMS NEG
no weakness/no change in level of consciousness/no blurred vision/no dizziness/no confusion/no nausea/no numbness/no loss of consciousness/no vomiting/no fever

## 2017-02-18 NOTE — ED PROVIDER NOTE - OBJECTIVE STATEMENT
61 y/o male with nonresectable brain tumor presents to the ED with slurred speech, expressive aph 63 y/o male with nonresectable brain tumor presents to the ED with slurred speech, expressive aphasia, and right-sided weakness. Per EMS daughter states she noticed pt's symptoms when he woke up around 11 AM.

## 2017-02-18 NOTE — ED PROVIDER NOTE - MEDICAL DECISION MAKING DETAILS
pt with transient ams, will give aspirin, ct head negative for bleeding, pt has known brain tumor , had mri last qweek at [place]. will give asa and control bp with labetalol. pt and family agree with plan

## 2017-02-18 NOTE — H&P ADULT - NSHPLABSRESULTS_GEN_ALL_CORE
CARDIAC MARKERS ( 18 Feb 2017 14:03 )  <0.015 ng/mL / x     / x     / x     / x                                14.8   12.6  )-----------( 139      ( 18 Feb 2017 14:03 )             41.8     18 Feb 2017 14:03    141    |  105    |  26     ----------------------------<  243    3.5     |  26     |  1.16     Ca    8.4        18 Feb 2017 14:03    TPro  6.6    /  Alb  3.5    /  TBili  0.3    /  DBili  x      /  AST  29     /  ALT  64     /  AlkPhos  102    18 Feb 2017 14:03    PT/INR - ( 18 Feb 2017 14:03 )   PT: 11.7 sec;   INR: 1.06 ratio         PTT - ( 18 Feb 2017 14:03 )  PTT:31.6 sec  CAPILLARY BLOOD GLUCOSE    LIVER FUNCTIONS - ( 18 Feb 2017 14:03 )  Alb: 3.5 g/dL / Pro: 6.6 gm/dL / ALK PHOS: 102 U/L / ALT: 64 U/L / AST: 29 U/L / GGT: x               CT Head:  2.6 cm low-density mass in the LEFT basal ganglia with mass   effect on the LEFT lateral ventricle. Encephalomalacia and gliosis is   seen in the LEFT temporal lobe with overlying craniotomy.   MRI with   gadolinium can be utilized for complete evaluation.

## 2017-02-18 NOTE — ED PROVIDER NOTE - DETAILS:
I, Kelli Putnam, performed the initial face to face bedside interview with this patient regarding history of present illness, review of symptoms and relevant past medical, social and family history.  I completed an independent physical examination.  I was the initial provider who evaluated this patient. The history, relevant review of systems, past medical and surgical history, medical decision making, and physical examination was documented by the scribe in my presence and I attest to the accuracy of the documentation.

## 2017-02-18 NOTE — ED PROVIDER NOTE - PROGRESS NOTE DETAILS
pt awake and alert, able to converse wthout slurred speech, much improved from when he arrived. will admit for tia, give asa and labetalol for blood pressure control pt with slurred speech  now,  will call neurology, donna velasquez

## 2017-02-19 LAB
ANION GAP SERPL CALC-SCNC: 8 MMOL/L — SIGNIFICANT CHANGE UP (ref 5–17)
BUN SERPL-MCNC: 23 MG/DL — SIGNIFICANT CHANGE UP (ref 7–23)
CALCIUM SERPL-MCNC: 8.4 MG/DL — LOW (ref 8.5–10.1)
CHLORIDE SERPL-SCNC: 104 MMOL/L — SIGNIFICANT CHANGE UP (ref 96–108)
CHOLEST SERPL-MCNC: 148 MG/DL — SIGNIFICANT CHANGE UP (ref 10–199)
CO2 SERPL-SCNC: 31 MMOL/L — SIGNIFICANT CHANGE UP (ref 22–31)
CREAT SERPL-MCNC: 1.02 MG/DL — SIGNIFICANT CHANGE UP (ref 0.5–1.3)
GLUCOSE SERPL-MCNC: 136 MG/DL — HIGH (ref 70–99)
HBA1C BLD-MCNC: 7 % — HIGH (ref 4–5.6)
HDLC SERPL-MCNC: 31 MG/DL — LOW (ref 40–125)
LIPID PNL WITH DIRECT LDL SERPL: 101 MG/DL — SIGNIFICANT CHANGE UP
POTASSIUM SERPL-MCNC: 3.4 MMOL/L — LOW (ref 3.5–5.3)
POTASSIUM SERPL-SCNC: 3.4 MMOL/L — LOW (ref 3.5–5.3)
SODIUM SERPL-SCNC: 143 MMOL/L — SIGNIFICANT CHANGE UP (ref 135–145)
TOTAL CHOLESTEROL/HDL RATIO MEASUREMENT: 4.8 RATIO — SIGNIFICANT CHANGE UP (ref 3.4–9.6)
TRIGL SERPL-MCNC: 80 MG/DL — SIGNIFICANT CHANGE UP (ref 10–149)

## 2017-02-19 PROCEDURE — 99223 1ST HOSP IP/OBS HIGH 75: CPT

## 2017-02-19 PROCEDURE — 70553 MRI BRAIN STEM W/O & W/DYE: CPT | Mod: 26

## 2017-02-19 RX ORDER — DEXAMETHASONE 0.5 MG/5ML
6 ELIXIR ORAL EVERY 6 HOURS
Qty: 0 | Refills: 0 | Status: DISCONTINUED | OUTPATIENT
Start: 2017-02-19 | End: 2017-02-20

## 2017-02-19 RX ORDER — HEPARIN SODIUM 5000 [USP'U]/ML
5000 INJECTION INTRAVENOUS; SUBCUTANEOUS EVERY 12 HOURS
Qty: 0 | Refills: 0 | Status: DISCONTINUED | OUTPATIENT
Start: 2017-02-19 | End: 2017-02-20

## 2017-02-19 RX ADMIN — Medication 1 MILLIGRAM(S): at 12:44

## 2017-02-19 RX ADMIN — DULOXETINE HYDROCHLORIDE 60 MILLIGRAM(S): 30 CAPSULE, DELAYED RELEASE ORAL at 12:44

## 2017-02-19 RX ADMIN — FAMOTIDINE 20 MILLIGRAM(S): 10 INJECTION INTRAVENOUS at 19:01

## 2017-02-19 RX ADMIN — AMLODIPINE BESYLATE 10 MILLIGRAM(S): 2.5 TABLET ORAL at 05:42

## 2017-02-19 RX ADMIN — Medication 6 MILLIGRAM(S): at 18:59

## 2017-02-19 RX ADMIN — ATORVASTATIN CALCIUM 40 MILLIGRAM(S): 80 TABLET, FILM COATED ORAL at 22:37

## 2017-02-19 RX ADMIN — HEPARIN SODIUM 5000 UNIT(S): 5000 INJECTION INTRAVENOUS; SUBCUTANEOUS at 19:00

## 2017-02-19 RX ADMIN — LEVETIRACETAM 1000 MILLIGRAM(S): 250 TABLET, FILM COATED ORAL at 05:42

## 2017-02-19 RX ADMIN — Medication 6 MILLIGRAM(S): at 23:56

## 2017-02-19 RX ADMIN — Medication 325 MILLIGRAM(S): at 12:43

## 2017-02-19 RX ADMIN — INSULIN GLARGINE 24 UNIT(S): 100 INJECTION, SOLUTION SUBCUTANEOUS at 22:37

## 2017-02-19 RX ADMIN — FAMOTIDINE 20 MILLIGRAM(S): 10 INJECTION INTRAVENOUS at 05:42

## 2017-02-19 RX ADMIN — Medication 100 MILLIGRAM(S): at 12:43

## 2017-02-19 RX ADMIN — LEVETIRACETAM 1000 MILLIGRAM(S): 250 TABLET, FILM COATED ORAL at 19:00

## 2017-02-19 RX ADMIN — Medication 2: at 19:00

## 2017-02-19 NOTE — CONSULT NOTE ADULT - SUBJECTIVE AND OBJECTIVE BOX
Patient is a 62y old  Male who presents with a chief complaint of slurred speech, blurred vision (18 Feb 2017 20:00)      HPI:  History obtained from sister. Pt is 63yo male with PMHx of DM, HTN, Opiod dependence, glioblastoma s/p resection at [facility] 2016, former smoker, presents with sudden onset of slurred speech, gait instability and right sided weakness. Symptoms lasted about 10-15 minutes, started to subside when EMS arrived. Pt denies syncope, falls, LOC, chest pain, sob, palpitations, med non compliance. No other constitutional symptoms. Still has residual Rt side weaknes with marked improvement in speech, and right sided strength. (18 Feb 2017 16:32).As per family he is suppose to restart on [place] and go for EEG in [facility]. Being followed by  recently had MRI brain unchanged.      PAST MEDICAL & SURGICAL HISTORY:  Glioblastoma  Essential hypertension  Diabetes mellitus of other type with hyperosmolarity, with long-term current use of insulin  Brain tumor  Brain tumor: brain tumor resection      FAMILY HISTORY:  No pertinent family history in first degree relatives      Social Hx:  Nonsmoker, no drug or alcohol use    MEDICATIONS  (STANDING):  atorvastatin 40milliGRAM(s) Oral at bedtime  aspirin enteric coated 325milliGRAM(s) Oral daily  insulin glargine Injectable (LANTUS) 24Unit(s) SubCutaneous at bedtime  insulin lispro (HumaLOG) corrective regimen sliding scale  SubCutaneous three times a day before meals  dextrose 5%. 1000milliLiter(s) IV Continuous <Continuous>  dextrose 50% Injectable 12.5Gram(s) IV Push once  dextrose 50% Injectable 25Gram(s) IV Push once  dextrose 50% Injectable 25Gram(s) IV Push once  levETIRAcetam 1000milliGRAM(s) Oral two times a day  DULoxetine 60milliGRAM(s) Oral daily  famotidine    Tablet 20milliGRAM(s) Oral two times a day  docusate sodium 100milliGRAM(s) Oral daily  folic acid 1milliGRAM(s) Oral daily  amLODIPine   Tablet 10milliGRAM(s) Oral daily       Allergies    No Known Allergies    Intolerances        ROS: Pertinent positives in HPI, all other ROS were reviewed and are negative.      Vital Signs Last 24 Hrs  T(C): 36.7, Max: 36.9 (02-18 @ 19:02)  T(F): 98, Max: 98.5 (02-18 @ 19:02)  HR: 74 (72 - 108)  BP: 106/87 (106/87 - 165/109)  BP(mean): --  RR: 17 (16 - 22)  SpO2: 90% (90% - 100%)        Constitutional: awake and alert.  HEENT: PERRLA, EOMI,   Neck: Supple.  Respiratory: Breath sounds are clear bilaterally  Cardiovascular: S1 and S2, regular / irregular rhythm  Gastrointestinal: soft, nontender  Extremities:  no edema  Vascular: Caritid Bruit - no  Musculoskeletal: no joint swelling/tenderness, no abnormal movements  Skin: No rashes    Neurological exam:  HF: A x O x 2. Appropriately interactive, normal affect. Speech with word finding difficulty ,with paraphasia . Naming /repetition affected   CN: MICHAEL, EOMI, VFF, facial sensation normal, Mild Rt facial tongue midline, Palate moves equally, SCM equal bilaterally  Motor: No pronator drift, Strength4/5 Rt side with Increased tone Arm and LE rest 5/5, normal bulk and tone, no tremor, rigidity or bradykinesia.    Sens: Intact to light touch / PP/ VS/ JS    Reflexes: Symmetric and normal . BJ 2+, BR 2+, KJ 2+, AJ 2+, downgoing toes Left withdrawing on Rt  Coord:  Rt side, dysmetria, MARGARET Decreased on Rt  Gait/Balance: Cannot test              Labs:   19 Feb 2017 06:32    143    |  104    |  23     ----------------------------<  136    3.4     |  31     |  1.02     Ca    8.4        19 Feb 2017 06:32    TPro  6.6    /  Alb  3.5    /  TBili  0.3    /  DBili  x      /  AST  29     /  ALT  64     /  AlkPhos  102    18 Feb 2017 14:03      02-19 [id number] 7.0                          14.8   12.6  )-----------( 139      ( 18 Feb 2017 14:03 )             41.8       Radiology:  - CT Head:  IMPRESSION:  2.6 cm low-density mass in theLEFT basal ganglia with mass   effect on the LEFT lateral ventricle. Encephalomalacia and gliosis is   seen in the LEFT temporal lobe with overlying craniotomy.   MRI with   gadolinium can be utilized for complete evaluation.        :

## 2017-02-19 NOTE — PROGRESS NOTE ADULT - SUBJECTIVE AND OBJECTIVE BOX
History obtained from sister.   Pt is 63yo male with PMHx of DM, HTN, Opiod dependence, glioblastoma s/p resection at [facility] 2016, with residual aphasia and RUE weakness,  on Chemotherapy, former smoker, presents with sudden onset of worsening slurred speech, gait instability and worsening right sided weakness. Symptoms lasted about 10-15 minutes, started to subside when EMS arrived. Pt denies syncope, falls, LOC, chest pain, sob, palpitations, med non compliance. No other constitutional symptoms.     2.19: aphasia and RUE weakness improved, close to baseline            REVIEW OF SYSTEMS:    CONSTITUTIONAL: No weakness, No fevers or chills  ENT: No ear ache, No sorethroat  NECK: No pain, No stiffness  RESPIRATORY: No cough, No wheezing, No hemoptysis; No dyspnea  CARDIOVASCULAR: No chest pain, No palpitations  GASTROINTESTINAL: No abd pain, No nausea, No vomiting, No hematemesis, No diarrhea or constipation. No melena, No hematochezia.  GENITOURINARY: No dysuria, No  hematuria  NEUROLOGICAL: No diplopia, No paresthesia, No motor dysfunction  SKIN: No rashes, or lesions   PSYCH: no anxiety, no suicidal ideation    All other review of systems is negative unless indicated above    Vital Signs Last 24 Hrs  T(C): 36.7, Max: 36.9 (02-18 @ 19:02)  T(F): 98, Max: 98.5 (02-18 @ 19:02)  HR: 74 (72 - 98)  BP: 106/87 (106/87 - 165/109)  BP(mean): --  RR: 17 (16 - 22)  SpO2: 90% (90% - 96%)    PHYSICAL EXAM:    GENERAL: NAD, Well nourished  HEENT:  NC/AT, EOMI, PERRLA, No scleral icterus, Moist mucous membranes  NECK: Supple, No JVD  CNS:  Alert & Oriented X3, Motor Strength 5/5 B/L upper and lower extremities; DTRs 2+ intact   LUNG: Normal Breath sounds, Clear to auscultation bilaterally, No rales, No rhonchi, No wheezing  HEART: RRR; No murmurs, No rubs  ABDOMEN: +BS, ST/ND/NT  GENITOURINARY- Voiding, Bladder not distended  EXTREMITIES:  2+ Peripheral Pulses, No clubbing, No cyanosis, No tibial edema  MUSCULOSKELTAL: Joints normal ROM, No joint tenderness, No muscle tenderness  VAGINAL: deferred  RECTAL: deferred, not indicated  BREAST: deferred                          14.8   12.6  )-----------( 139      ( 18 Feb 2017 14:03 )             41.8     19 Feb 2017 06:32    143    |  104    |  23     ----------------------------<  136    3.4     |  31     |  1.02     Ca    8.4        19 Feb 2017 06:32    TPro  6.6    /  Alb  3.5    /  TBili  0.3    /  DBili  x      /  AST  29     /  ALT  64     /  AlkPhos  102    18 Feb 2017 14:03    Vancomycin levels:   Cultures:     MEDICATIONS  (STANDING):  atorvastatin 40milliGRAM(s) Oral at bedtime  aspirin enteric coated 325milliGRAM(s) Oral daily  insulin glargine Injectable (LANTUS) 24Unit(s) SubCutaneous at bedtime  insulin lispro (HumaLOG) corrective regimen sliding scale  SubCutaneous three times a day before meals  dextrose 5%. 1000milliLiter(s) IV Continuous <Continuous>  dextrose 50% Injectable 12.5Gram(s) IV Push once  dextrose 50% Injectable 25Gram(s) IV Push once  dextrose 50% Injectable 25Gram(s) IV Push once  levETIRAcetam 1000milliGRAM(s) Oral two times a day  DULoxetine 60milliGRAM(s) Oral daily  famotidine    Tablet 20milliGRAM(s) Oral two times a day  docusate sodium 100milliGRAM(s) Oral daily  folic acid 1milliGRAM(s) Oral daily  amLODIPine   Tablet 10milliGRAM(s) Oral daily    MEDICATIONS  (PRN):  dextrose Gel 1Dose(s) Oral once PRN Blood Glucose LESS THAN 70 milliGRAM(s)/deciliter  glucagon  Injectable 1milliGRAM(s) IntraMuscular once PRN Glucose LESS THAN 70 milligrams/deciliter      1. TIA vs Seizures with subseq Mac's paralysis:  Neuro eval noted  MRI brain with contrast  CT brain: shows Left basal ganglia tumor with vasogenic edema and compression on Lateral ventricle...? steroids....will d/w neurology  EEG  cw Keppra   cw ASA/Lipitor    2. Htn: stable  cw Norvasc    3. DM type II:  cw Lantus/Novolog SS

## 2017-02-19 NOTE — CONSULT NOTE ADULT - ASSESSMENT
Pt with S/P Glioblastoma resection in 6/16 with Residual tumor S/p RT on Chemotherapy and SEizure prophylaxis admitted with sudden onset of Slurred speech and Recurrent episodes of Rt side weakness R/o TIA/Focal seizures with Mac's paralysis  REC: Agree with MRI/MRA brain and Carotids.  EEG  Continue Keppra.  Correct underlying Metabolic causes.  PT/OT.  Will F/U   Discussed with Sister and Brother.

## 2017-02-20 VITALS
HEART RATE: 97 BPM | OXYGEN SATURATION: 95 % | TEMPERATURE: 97 F | SYSTOLIC BLOOD PRESSURE: 98 MMHG | RESPIRATION RATE: 17 BRPM | WEIGHT: 175.49 LBS | DIASTOLIC BLOOD PRESSURE: 64 MMHG

## 2017-02-20 LAB
ANION GAP SERPL CALC-SCNC: 9 MMOL/L — SIGNIFICANT CHANGE UP (ref 5–17)
BUN SERPL-MCNC: 34 MG/DL — HIGH (ref 7–23)
CALCIUM SERPL-MCNC: 8.9 MG/DL — SIGNIFICANT CHANGE UP (ref 8.5–10.1)
CHLORIDE SERPL-SCNC: 103 MMOL/L — SIGNIFICANT CHANGE UP (ref 96–108)
CO2 SERPL-SCNC: 29 MMOL/L — SIGNIFICANT CHANGE UP (ref 22–31)
CREAT SERPL-MCNC: 1.43 MG/DL — HIGH (ref 0.5–1.3)
GLUCOSE SERPL-MCNC: 330 MG/DL — HIGH (ref 70–99)
HCT VFR BLD CALC: 48 % — SIGNIFICANT CHANGE UP (ref 39–50)
HGB BLD-MCNC: 16.4 G/DL — SIGNIFICANT CHANGE UP (ref 13–17)
MCHC RBC-ENTMCNC: 30.7 PG — SIGNIFICANT CHANGE UP (ref 27–34)
MCHC RBC-ENTMCNC: 34.2 GM/DL — SIGNIFICANT CHANGE UP (ref 32–36)
MCV RBC AUTO: 89.8 FL — SIGNIFICANT CHANGE UP (ref 80–100)
PLATELET # BLD AUTO: 177 K/UL — SIGNIFICANT CHANGE UP (ref 150–400)
POTASSIUM SERPL-MCNC: 4.5 MMOL/L — SIGNIFICANT CHANGE UP (ref 3.5–5.3)
POTASSIUM SERPL-SCNC: 4.5 MMOL/L — SIGNIFICANT CHANGE UP (ref 3.5–5.3)
RBC # BLD: 5.35 M/UL — SIGNIFICANT CHANGE UP (ref 4.2–5.8)
RBC # FLD: 12.1 % — SIGNIFICANT CHANGE UP (ref 10.3–14.5)
SODIUM SERPL-SCNC: 141 MMOL/L — SIGNIFICANT CHANGE UP (ref 135–145)
WBC # BLD: 11 K/UL — HIGH (ref 3.8–10.5)
WBC # FLD AUTO: 11 K/UL — HIGH (ref 3.8–10.5)

## 2017-02-20 PROCEDURE — 99233 SBSQ HOSP IP/OBS HIGH 50: CPT

## 2017-02-20 RX ORDER — DEXAMETHASONE 0.5 MG/5ML
4 ELIXIR ORAL EVERY 12 HOURS
Qty: 0 | Refills: 0 | Status: DISCONTINUED | OUTPATIENT
Start: 2017-02-20 | End: 2017-02-20

## 2017-02-20 RX ORDER — ASPIRIN/CALCIUM CARB/MAGNESIUM 324 MG
1 TABLET ORAL
Qty: 0 | Refills: 0 | COMMUNITY
Start: 2017-02-20

## 2017-02-20 RX ORDER — LEVETIRACETAM 250 MG/1
1500 TABLET, FILM COATED ORAL EVERY 12 HOURS
Qty: 0 | Refills: 0 | Status: DISCONTINUED | OUTPATIENT
Start: 2017-02-20 | End: 2017-02-20

## 2017-02-20 RX ORDER — AMLODIPINE BESYLATE 2.5 MG/1
1 TABLET ORAL
Qty: 0 | Refills: 0 | COMMUNITY
Start: 2017-02-20

## 2017-02-20 RX ORDER — DEXAMETHASONE 0.5 MG/5ML
1 ELIXIR ORAL
Qty: 0 | Refills: 0 | COMMUNITY
Start: 2017-02-20

## 2017-02-20 RX ORDER — SODIUM CHLORIDE 9 MG/ML
1000 INJECTION INTRAMUSCULAR; INTRAVENOUS; SUBCUTANEOUS
Qty: 0 | Refills: 0 | Status: DISCONTINUED | OUTPATIENT
Start: 2017-02-20 | End: 2017-02-20

## 2017-02-20 RX ORDER — INSULIN GLARGINE 100 [IU]/ML
24 INJECTION, SOLUTION SUBCUTANEOUS
Qty: 0 | Refills: 0 | COMMUNITY
Start: 2017-02-20

## 2017-02-20 RX ORDER — ATORVASTATIN CALCIUM 80 MG/1
1 TABLET, FILM COATED ORAL
Qty: 0 | Refills: 0 | COMMUNITY
Start: 2017-02-20

## 2017-02-20 RX ADMIN — LEVETIRACETAM 1000 MILLIGRAM(S): 250 TABLET, FILM COATED ORAL at 06:25

## 2017-02-20 RX ADMIN — Medication 6 MILLIGRAM(S): at 12:40

## 2017-02-20 RX ADMIN — HEPARIN SODIUM 5000 UNIT(S): 5000 INJECTION INTRAVENOUS; SUBCUTANEOUS at 06:25

## 2017-02-20 RX ADMIN — Medication 8: at 08:58

## 2017-02-20 RX ADMIN — Medication 10: at 12:41

## 2017-02-20 RX ADMIN — FAMOTIDINE 20 MILLIGRAM(S): 10 INJECTION INTRAVENOUS at 06:25

## 2017-02-20 RX ADMIN — Medication 100 MILLIGRAM(S): at 12:41

## 2017-02-20 RX ADMIN — DULOXETINE HYDROCHLORIDE 60 MILLIGRAM(S): 30 CAPSULE, DELAYED RELEASE ORAL at 12:41

## 2017-02-20 RX ADMIN — Medication 1 MILLIGRAM(S): at 12:41

## 2017-02-20 RX ADMIN — Medication 325 MILLIGRAM(S): at 12:41

## 2017-02-20 RX ADMIN — AMLODIPINE BESYLATE 10 MILLIGRAM(S): 2.5 TABLET ORAL at 06:24

## 2017-02-20 RX ADMIN — Medication 6 MILLIGRAM(S): at 06:24

## 2017-02-20 NOTE — DISCHARGE NOTE ADULT - PATIENT PORTAL LINK FT
“You can access the [facility] Patient Portal, offered by [facility], by registering with the following website: [email or web address]”

## 2017-02-20 NOTE — DISCHARGE NOTE ADULT - CARE PLAN
Principal Discharge DX:	TIA (transient ischemic attack)  Goal:	feel better  Instructions for follow-up, activity and diet:	diabetic diet  Secondary Diagnosis:	Brain tumor  Secondary Diagnosis:	Glioblastoma  Secondary Diagnosis:	Essential hypertension  Secondary Diagnosis:	Diabetes mellitus of other type with hyperosmolarity, with long-term current use of insulin

## 2017-02-20 NOTE — DISCHARGE NOTE ADULT - MEDICATION SUMMARY - MEDICATIONS TO TAKE
I will START or STAY ON the medications listed below when I get home from the hospital:    dexamethasone 4 mg oral tablet  -- 1 tab(s) by mouth every 12 hours  -- Indication: For Brain tumor    aspirin 325 mg oral delayed release tablet  -- 1 tab(s) by mouth once a day  -- Indication: For TIA (transient ischemic attack)    Keppra 1000 mg oral tablet  -- 1.5 tab(s) by mouth 2 times a day  -- Indication: For seizure    DULoxetine 30 mg oral delayed release capsule  -- 60 milligram(s) by mouth once a day  -- Indication: For anxiety    insulin glargine  -- 24 unit(s) subcutaneous once a day (at bedtime)  -- Indication: For Dm    atorvastatin 40 mg oral tablet  -- 1 tab(s) by mouth once a day (at bedtime)  -- Indication: For Dm    amLODIPine 10 mg oral tablet  -- 1 tab(s) by mouth once a day  -- Indication: For htn    famotidine 20 mg oral tablet  -- 1 tab(s) by mouth 2 times a day  -- Indication: For Gerd    folic acid 1 mg oral tablet  -- 1 tab(s) by mouth once a day  -- Indication: For mvi

## 2017-02-20 NOTE — DISCHARGE NOTE ADULT - CARE PROVIDER_API CALL
Marine Mccabe), Neurology  General  [address] [place] [address]  Phone: [phone]  Fax: [phone]

## 2017-02-20 NOTE — PROGRESS NOTE ADULT - ASSESSMENT
Pt with S/P Glioblastoma resection in 6/16 with Residual tumor S/P RT on Chemotherapy and Seizure prophylaxis admitted with sudden onset of Slurred speech and Recurrent episodes of Rt side weakness R/o TIA/Focal seizures with Mac's paralysis    Continue Keppra.  Correct underlying Metabolic causes.  Medical Mgt as per Medicine.  No NS intervention at this time.     PT/OT.

## 2017-02-20 NOTE — PHYSICAL THERAPY INITIAL EVALUATION ADULT - ACTIVE RANGE OF MOTION EXAMINATION, REHAB EVAL
limited active b/l shoulder flexion/bilateral  lower extremity Active ROM was WFL (within functional limits)/bilateral upper extremity Active ROM was WFL (within functional limits)

## 2017-02-20 NOTE — PHYSICAL THERAPY INITIAL EVALUATION ADULT - REHAB POTENTIAL, PT EVAL
Pt. will be d/c from PT in acute care setting due to performing transfers and ambulation w/ supervision. Pt. will benefit from home PT/outpatient PT to improve balance w/ mobility. Pt. may ambulate as tolerated w/ RN staff w/ RW.

## 2017-02-20 NOTE — PHYSICAL THERAPY INITIAL EVALUATION ADULT - ADDITIONAL COMMENTS
History taken from pt. who is confused. Pt. reports living in a house w/ family w/out steps. Pt. reports ambulating w/ a RW at baseline.

## 2017-02-20 NOTE — PROGRESS NOTE ADULT - SUBJECTIVE AND OBJECTIVE BOX
History obtained from sister.   Pt is 61yo male with PMHx of DM, HTN, Opiod dependence, glioblastoma s/p resection at [facility] 2016, with residual aphasia and RUE weakness,  on Chemotherapy, former smoker, presents with sudden onset of worsening slurred speech, gait instability and worsening right sided weakness. Symptoms lasted about 10-15 minutes, started to subside when EMS arrived. Pt denies syncope, falls, LOC, chest pain, sob, palpitations, med non compliance. No other constitutional symptoms. MRI brain reveals residual tumor with vasogenic edema    2.19: aphasia and RUE weakness improved, close to baseline  2.20: aphasia and RUE weakness have improved to baseline;             REVIEW OF SYSTEMS:    CONSTITUTIONAL: No weakness, No fevers or chills  ENT: No ear ache, No sorethroat  NECK: No pain, No stiffness  RESPIRATORY: No cough, No wheezing, No hemoptysis; No dyspnea  CARDIOVASCULAR: No chest pain, No palpitations  GASTROINTESTINAL: No abd pain, No nausea, No vomiting, No hematemesis, No diarrhea or constipation. No melena, No hematochezia.  GENITOURINARY: No dysuria, No  hematuria  NEUROLOGICAL: No diplopia, No paresthesia, No motor dysfunction  SKIN: No rashes, or lesions   PSYCH: no anxiety, no suicidal ideation    All other review of systems is negative unless indicated above    Vital Signs Last 24 Hrs  T(C): 36.3, Max: 37.1 (02-20 @ 06:51)  T(F): 97.4, Max: 98.8 (02-20 @ 06:51)  HR: 97 (75 - 104)  BP: 98/64 (98/64 - 132/83)  BP(mean): --  RR: 17 (16 - 18)  SpO2: 95% (92% - 95%)    PHYSICAL EXAM:    GENERAL: NAD, Well nourished  HEENT:  NC/AT, EOMI, PERRLA, No scleral icterus, Moist mucous membranes  NECK: Supple, No JVD  CNS:  Alert & Oriented X3, Motor Strength 5/5 B/L upper and lower extremities; DTRs 2+ intact   LUNG: Normal Breath sounds, Clear to auscultation bilaterally, No rales, No rhonchi, No wheezing  HEART: RRR; No murmurs, No rubs  ABDOMEN: +BS, ST/ND/NT  GENITOURINARY- Voiding, Bladder not distended  EXTREMITIES:  2+ Peripheral Pulses, No clubbing, No cyanosis, No tibial edema  MUSCULOSKELTAL: Joints normal ROM, No joint tenderness, No muscle tenderness  VAGINAL: deferred  RECTAL: deferred, not indicated  BREAST: deferred               Labs:                        16.4   11.0  )-----------( 177      ( 20 Feb 2017 06:54 )             48.0     20 Feb 2017 06:54    141    |  103    |  34     ----------------------------<  330    4.5     |  29     |  1.43     Ca    8.9        20 Feb 2017 06:54    TPro  6.6    /  Alb  3.5    /  TBili  0.3    /  DBili  x      /  AST  29     /  ALT  64     /  AlkPhos  102    18 Feb 2017 14:03         MEDICATIONS  (STANDING):  atorvastatin 40milliGRAM(s) Oral at bedtime  aspirin enteric coated 325milliGRAM(s) Oral daily  insulin glargine Injectable (LANTUS) 24Unit(s) SubCutaneous at bedtime  insulin lispro (HumaLOG) corrective regimen sliding scale  SubCutaneous three times a day before meals  dextrose 5%. 1000milliLiter(s) IV Continuous <Continuous>  dextrose 50% Injectable 12.5Gram(s) IV Push once  dextrose 50% Injectable 25Gram(s) IV Push once  dextrose 50% Injectable 25Gram(s) IV Push once  levETIRAcetam 1000milliGRAM(s) Oral two times a day  DULoxetine 60milliGRAM(s) Oral daily  famotidine    Tablet 20milliGRAM(s) Oral two times a day  docusate sodium 100milliGRAM(s) Oral daily  folic acid 1milliGRAM(s) Oral daily  amLODIPine   Tablet 10milliGRAM(s) Oral daily  dexamethasone  Injectable 6milliGRAM(s) IV Push every 6 hours  heparin  Injectable 5000Unit(s) SubCutaneous every 12 hours    MEDICATIONS  (PRN):  dextrose Gel 1Dose(s) Oral once PRN Blood Glucose LESS THAN 70 milliGRAM(s)/deciliter  glucagon  Injectable 1milliGRAM(s) IntraMuscular once PRN Glucose LESS THAN 70 milligrams/deciliter    MRI brain:  The brain demonstrates the presence of a 3.5 cm necrotic neoplasm within   the LEFT basal ganglia with extension of 5 cm enhancing neoplasm along   the anterior medial LEFT temporal lobe with associated dural enhancement.   There is moderate edema with out significant mass effect.   Encephalomalacia and gliosis is seen in the LEFT temporal lobe, sequela   of prior treatment. No acute cerebral cortical infarct is found.   No   intracranial hemorrhage is recognized.       1. TIA vs Seizures with subseq Mac's paralysis:  Neuro eval noted  MRI brain with contrast noted; will decrease steroid dose  EEG to r/o active epileptiform activity  cw Keppra   cw ASA/Lipitor    2. Htn: stable  cw Norvasc    3. DM type II:  cw Lantus/Novolog SS

## 2017-02-20 NOTE — PHYSICAL THERAPY INITIAL EVALUATION ADULT - PERTINENT HX OF CURRENT PROBLEM, REHAB EVAL
Pt. presents w/ sudden onset of slurred speech, gait instability and worsening R sided weakness which lasted for 15 minutes. MRI reveals 3.5cm recurrent necrotic neoplasm within the L basal ganglia and temporal lobe.

## 2017-02-20 NOTE — PROGRESS NOTE ADULT - SUBJECTIVE AND OBJECTIVE BOX
HPI:   History obtained from sister. Pt is 61yo male with PMHx of DM, HTN, Opiod dependence, glioblastoma s/p resection at [facility] 2016, former smoker, presents with sudden onset of slurred speech, gait instability and right sided weakness. Symptoms lasted about 10-15 minutes, started to subside when EMS arrived. Pt denies syncope, falls, LOC, chest pain, sob, palpitations, med non compliance. No other constitutional symptoms. Still has residual Rt side weaknes with marked improvement in speech, and right sided strength. (18 Feb 2017 16:32).As per family he is suppose to restart on [place] and go for EEG in [facility]. Being followed by Dr. Silverio recently had MRI brain unchanged.    2/20/17: Pt in bed, c/o mild headache, no new c/o. No focal new changes. MRI brain done with contrast .Awaiting NS consult. Pt offer no new c/o at present.    PAST MEDICAL & SURGICAL HISTORY:  Glioblastoma  Essential hypertension  Diabetes mellitus of other type with hyperosmolarity, with long-term current use of insulin  Brain tumor  Brain tumor: brain tumor resection    ROS: Same as in HPI    MEDICATIONS  (STANDING):  atorvastatin 40milliGRAM(s) Oral at bedtime  aspirin enteric coated 325milliGRAM(s) Oral daily  insulin glargine Injectable (LANTUS) 24Unit(s) SubCutaneous at bedtime  insulin lispro (HumaLOG) corrective regimen sliding scale  SubCutaneous three times a day before meals  dextrose 5%. 1000milliLiter(s) IV Continuous <Continuous>  dextrose 50% Injectable 12.5Gram(s) IV Push once  dextrose 50% Injectable 25Gram(s) IV Push once  dextrose 50% Injectable 25Gram(s) IV Push once  levETIRAcetam 1000milliGRAM(s) Oral two times a day  DULoxetine 60milliGRAM(s) Oral daily  famotidine    Tablet 20milliGRAM(s) Oral two times a day  docusate sodium 100milliGRAM(s) Oral daily  folic acid 1milliGRAM(s) Oral daily  amLODIPine   Tablet 10milliGRAM(s) Oral daily  dexamethasone  Injectable 6milliGRAM(s) IV Push every 6 hours  heparin  Injectable 5000Unit(s) SubCutaneous every 12 hours      Vital Signs Last 24 Hrs  T(C): 37.1, Max: 37.1 (02-20 @ 06:51)  T(F): 98.8, Max: 98.8 (02-20 @ 06:51)  HR: 104 (74 - 104)  BP: 112/76 (106/87 - 132/83)  BP(mean): --  RR: 16 (16 - 18)  SpO2: 95% (90% - 95%)    Neurological exam:  HF: A x O x 2. Appropriately interactive, normal affect. Speech with word finding difficulty ,with paraphasia . Naming /repetition affected   CN: MICHAEL, EOMI, VFF, facial sensation normal, Mild Rt facial tongue midline, Palate moves equally, SCM equal bilaterally  Motor: No pronator drift, Strength4/5 Rt side with Increased tone Arm and LE rest 5/5, normal bulk and tone, no tremor, rigidity or bradykinesia.    Sens: Intact to light touch / PP/ VS/ JS    Reflexes: Symmetric and normal . BJ 2+, BR 2+, KJ 2+, AJ 2+, downgoing toes Left withdrawing on Rt  Coord:  Rt side, dysmetria, MARGARET Decreased on Rt  Gait/Balance: Cannot test                        16.4   11.0  )-----------( 177      ( 20 Feb 2017 06:54 )             48.0     20 Feb 2017 06:54    141    |  103    |  34     ----------------------------<  330    4.5     |  29     |  1.43     Ca    8.9        20 Feb 2017 06:54    TPro  6.6    /  Alb  3.5    /  TBili  0.3    /  DBili  x      /  AST  29     /  ALT  64     /  AlkPhos  102    18 Feb 2017 14:03    02-19 [id number] 7.0    02-19 Chol 148  HDL 31<L> Trig 80    Radiology report:  - CT Head 2/18/17    IMPRESSION:      2.6 cm low-density mass in theLEFT basal ganglia with mass   effect on the LEFT lateral ventricle. Encephalomalacia and gliosis is   seen in the LEFT temporal lobe with overlying craniotomy.   MRI with   gadolinium can be utilized for complete evaluation.    - MRI brain 2/19/17    IMPRESSION:      3.5 cm recurrent necrotic neoplasm , likely tibia, within   the LEFT basal ganglia with extension of 5 cm enhancing neoplasm along   the anterior medial LEFT temporal lobe with associated dural enhancement.   Moderate associated edema. No significant mass effect.

## 2017-02-20 NOTE — DISCHARGE NOTE ADULT - MEDICATION SUMMARY - MEDICATIONS TO STOP TAKING
I will STOP taking the medications listed below when I get home from the hospital:    simvastatin 40 mg oral tablet  -- 1 tab(s) by mouth once a day (at bedtime)

## 2017-02-20 NOTE — PROGRESS NOTE ADULT - ASSESSMENT
· Assessment		  Pt with S/P Glioblastoma resection in 6/16 with Residual tumor  with Tumor reccrence can not be rule out  S/p RT on Chemotherapy and SEizure prophylaxis admitted with sudden onset of Slurred speech and Recurrent episodes of Rt side weakness   R/o TIA/Focal seizures with Mac's paralysis  REC: Agree with MRI with contrast  EEG  Continue Keppra .adjust dose depending on EEG/Levels  Correct underlying Metabolic causes.  PT/OT.  Discussed with Pt's sister and .  NS consult.  Will f/u .  Discussed with Sister and Brother.

## 2017-02-20 NOTE — DISCHARGE NOTE ADULT - NS AS DC STROKE ED MATERIALS
Stroke Education Booklet/Call 911 for Stroke/Prescribed Medications/Stroke Warning Signs and Symptoms/Need for Followup After Discharge/Risk Factors for Stroke

## 2017-02-20 NOTE — DISCHARGE NOTE ADULT - HOSPITAL COURSE
61 yo male with PMHx of DM, HTN, Opiod dependence, glioblastoma s/p resection at [facility] 2016, with residual aphasia and RUE weakness,  on Chemotherapy, former smoker, presents with sudden onset of worsening slurred speech, gait instability and worsening right sided weakness. Symptoms lasted about 10-15 minutes, started to subside when EMS arrived. Pt denies syncope, falls, LOC, chest pain, sob, palpitations, med non compliance. No other constitutional symptoms. MRI brain reveals residual tumor with vasogenic edema.    2.19: aphasia and RUE weakness improved, close to baseline  2.20: aphasia and RUE weakness have improved to baseline;             REVIEW OF SYSTEMS:    CONSTITUTIONAL: No weakness, No fevers or chills  ENT: No ear ache, No sorethroat  NECK: No pain, No stiffness  RESPIRATORY: No cough, No wheezing, No hemoptysis; No dyspnea  CARDIOVASCULAR: No chest pain, No palpitations  GASTROINTESTINAL: No abd pain, No nausea, No vomiting, No hematemesis, No diarrhea or constipation. No melena, No hematochezia.  GENITOURINARY: No dysuria, No  hematuria  NEUROLOGICAL: No diplopia, No paresthesia, No motor dysfunction  SKIN: No rashes, or lesions   PSYCH: no anxiety, no suicidal ideation    All other review of systems is negative unless indicated above    Vital Signs Last 24 Hrs  T(C): 36.3, Max: 37.1 (02-20 @ 06:51)  T(F): 97.4, Max: 98.8 (02-20 @ 06:51)  HR: 97 (75 - 104)  BP: 98/64 (98/64 - 132/83)  BP(mean): --  RR: 17 (16 - 18)  SpO2: 95% (92% - 95%)    PHYSICAL EXAM:    GENERAL: NAD, Well nourished  HEENT:  NC/AT, EOMI, PERRLA, No scleral icterus, Moist mucous membranes  NECK: Supple, No JVD  CNS:  Alert & Oriented X2, RUE 4/5 motor, +aphasia  LUNG: Normal Breath sounds, Clear to auscultation bilaterally, No rales, No rhonchi, No wheezing  HEART: RRR; No murmurs, No rubs  ABDOMEN: +BS, ST/ND/NT  GENITOURINARY- Voiding, Bladder not distended  EXTREMITIES:  2+ Peripheral Pulses, No clubbing, No cyanosis, No tibial edema  MUSCULOSKELTAL: Joints normal ROM, No joint tenderness, No muscle tenderness  VAGINAL: deferred  RECTAL: deferred, not indicated  BREAST: deferred               Labs:                        16.4   11.0  )-----------( 177      ( 20 Feb 2017 06:54 )             48.0     20 Feb 2017 06:54    141    |  103    |  34     ----------------------------<  330    4.5     |  29     |  1.43     Ca    8.9        20 Feb 2017 06:54    TPro  6.6    /  Alb  3.5    /  TBili  0.3    /  DBili  x      /  AST  29     /  ALT  64     /  AlkPhos  102    18 Feb 2017 14:03             1. TIA vs Seizures with subseq Mac's paralysis:  Neuro eval noted  MRI brain with contrast noted; will decrease steroid dose  EEG to r/o active epileptiform activity  cw Keppra   cw ASA/Lipitor    2. Htn: stable  cw Norvasc    3. DM type II:  cw Lantus/Novolog SS

## 2017-02-20 NOTE — DISCHARGE NOTE ADULT - SECONDARY DIAGNOSIS.
Brain tumor Glioblastoma Essential hypertension Diabetes mellitus of other type with hyperosmolarity, with long-term current use of insulin

## 2017-02-24 DIAGNOSIS — R47.81 SLURRED SPEECH: ICD-10-CM

## 2017-02-24 DIAGNOSIS — Z87.891 PERSONAL HISTORY OF NICOTINE DEPENDENCE: ICD-10-CM

## 2017-02-24 DIAGNOSIS — G93.6 CEREBRAL EDEMA: ICD-10-CM

## 2017-02-24 DIAGNOSIS — G45.9 TRANSIENT CEREBRAL ISCHEMIC ATTACK, UNSPECIFIED: ICD-10-CM

## 2017-02-24 DIAGNOSIS — I10 ESSENTIAL (PRIMARY) HYPERTENSION: ICD-10-CM

## 2017-02-24 DIAGNOSIS — R56.9 UNSPECIFIED CONVULSIONS: ICD-10-CM

## 2017-02-24 DIAGNOSIS — C71.9 MALIGNANT NEOPLASM OF BRAIN, UNSPECIFIED: ICD-10-CM

## 2017-02-24 DIAGNOSIS — G83.84 TODD'S PARALYSIS (POSTEPILEPTIC): ICD-10-CM

## 2017-02-24 DIAGNOSIS — E11.9 TYPE 2 DIABETES MELLITUS WITHOUT COMPLICATIONS: ICD-10-CM

## 2017-02-27 RX ORDER — INSULIN LISPRO 100/ML
5 VIAL (ML) SUBCUTANEOUS
Qty: 0 | Refills: 0 | COMMUNITY

## 2017-02-27 RX ORDER — ACETAMINOPHEN 500 MG
0 TABLET ORAL
Qty: 0 | Refills: 0 | COMMUNITY

## 2017-02-27 RX ORDER — SIMVASTATIN 20 MG/1
0 TABLET, FILM COATED ORAL
Qty: 0 | Refills: 0 | COMMUNITY

## 2017-02-27 RX ORDER — FAMOTIDINE 10 MG/ML
0 INJECTION INTRAVENOUS
Qty: 0 | Refills: 0 | COMMUNITY

## 2017-02-27 RX ORDER — TEMOZOLOMIDE 140 MG/1
1 CAPSULE ORAL
Qty: 0 | Refills: 0 | COMMUNITY

## 2017-02-27 RX ORDER — DOCUSATE SODIUM 100 MG
30 CAPSULE ORAL
Qty: 0 | Refills: 0 | COMMUNITY

## 2017-02-27 RX ORDER — LEVETIRACETAM 250 MG/1
0 TABLET, FILM COATED ORAL
Qty: 0 | Refills: 0 | COMMUNITY

## 2017-02-27 RX ORDER — LEVETIRACETAM 250 MG/1
1 TABLET, FILM COATED ORAL
Qty: 0 | Refills: 0 | COMMUNITY

## 2017-02-27 RX ORDER — DOCUSATE SODIUM 100 MG
0 CAPSULE ORAL
Qty: 0 | Refills: 0 | COMMUNITY

## 2017-02-27 RX ORDER — FOLIC ACID 0.8 MG
0 TABLET ORAL
Qty: 0 | Refills: 0 | COMMUNITY

## 2017-02-27 RX ORDER — INSULIN GLARGINE 100 [IU]/ML
0 INJECTION, SOLUTION SUBCUTANEOUS
Qty: 0 | Refills: 0 | COMMUNITY

## 2017-02-27 RX ORDER — SIMVASTATIN 20 MG/1
1 TABLET, FILM COATED ORAL
Qty: 0 | Refills: 0 | COMMUNITY

## 2017-02-27 RX ORDER — DULOXETINE HYDROCHLORIDE 30 MG/1
0 CAPSULE, DELAYED RELEASE ORAL
Qty: 0 | Refills: 0 | COMMUNITY

## 2017-02-27 RX ORDER — LOSARTAN POTASSIUM 100 MG/1
1 TABLET, FILM COATED ORAL
Qty: 0 | Refills: 0 | COMMUNITY

## 2017-04-15 ENCOUNTER — EMERGENCY (EMERGENCY)
Facility: HOSPITAL | Age: 62
LOS: 1 days | End: 2017-04-15
Payer: MEDICARE

## 2017-04-15 DIAGNOSIS — Z98.890 OTHER SPECIFIED POSTPROCEDURAL STATES: Chronic | ICD-10-CM

## 2017-04-15 DIAGNOSIS — D49.6 NEOPLASM OF UNSPECIFIED BEHAVIOR OF BRAIN: Chronic | ICD-10-CM

## 2017-04-15 DIAGNOSIS — Z96.642 PRESENCE OF LEFT ARTIFICIAL HIP JOINT: Chronic | ICD-10-CM

## 2017-04-15 PROCEDURE — 70450 CT HEAD/BRAIN W/O DYE: CPT | Mod: 26

## 2017-04-15 PROCEDURE — 70498 CT ANGIOGRAPHY NECK: CPT | Mod: 26

## 2017-04-15 PROCEDURE — 71010: CPT | Mod: 26

## 2017-04-15 PROCEDURE — 99285 EMERGENCY DEPT VISIT HI MDM: CPT

## 2017-04-15 PROCEDURE — 70496 CT ANGIOGRAPHY HEAD: CPT | Mod: 26

## 2017-04-23 ENCOUNTER — INPATIENT (INPATIENT)
Facility: HOSPITAL | Age: 62
LOS: 3 days | Discharge: HOSPICE | DRG: 64 | End: 2017-04-27
Attending: HOSPITALIST | Admitting: INTERNAL MEDICINE
Payer: MEDICARE

## 2017-04-23 VITALS
OXYGEN SATURATION: 98 % | TEMPERATURE: 98 F | WEIGHT: 154.1 LBS | SYSTOLIC BLOOD PRESSURE: 164 MMHG | DIASTOLIC BLOOD PRESSURE: 85 MMHG | RESPIRATION RATE: 16 BRPM | HEART RATE: 64 BPM

## 2017-04-23 DIAGNOSIS — D49.6 NEOPLASM OF UNSPECIFIED BEHAVIOR OF BRAIN: Chronic | ICD-10-CM

## 2017-04-23 DIAGNOSIS — Z29.9 ENCOUNTER FOR PROPHYLACTIC MEASURES, UNSPECIFIED: ICD-10-CM

## 2017-04-23 DIAGNOSIS — Z96.642 PRESENCE OF LEFT ARTIFICIAL HIP JOINT: Chronic | ICD-10-CM

## 2017-04-23 DIAGNOSIS — E11.9 TYPE 2 DIABETES MELLITUS WITHOUT COMPLICATIONS: ICD-10-CM

## 2017-04-23 DIAGNOSIS — I10 ESSENTIAL (PRIMARY) HYPERTENSION: ICD-10-CM

## 2017-04-23 DIAGNOSIS — R56.9 UNSPECIFIED CONVULSIONS: ICD-10-CM

## 2017-04-23 DIAGNOSIS — D49.6 NEOPLASM OF UNSPECIFIED BEHAVIOR OF BRAIN: ICD-10-CM

## 2017-04-23 DIAGNOSIS — I63.8 OTHER CEREBRAL INFARCTION: ICD-10-CM

## 2017-04-23 DIAGNOSIS — K59.00 CONSTIPATION, UNSPECIFIED: ICD-10-CM

## 2017-04-23 DIAGNOSIS — Z98.890 OTHER SPECIFIED POSTPROCEDURAL STATES: Chronic | ICD-10-CM

## 2017-04-23 PROBLEM — R41.89 OTHER SYMPTOMS AND SIGNS INVOLVING COGNITIVE FUNCTIONS AND AWARENESS: Chronic | Status: ACTIVE | Noted: 2017-02-07

## 2017-04-23 PROBLEM — E78.5 HYPERLIPIDEMIA, UNSPECIFIED: Chronic | Status: ACTIVE | Noted: 2017-02-07

## 2017-04-23 LAB
ALBUMIN SERPL ELPH-MCNC: 3.8 G/DL — SIGNIFICANT CHANGE UP (ref 3.3–5.2)
ALP SERPL-CCNC: 82 U/L — SIGNIFICANT CHANGE UP (ref 40–120)
ALT FLD-CCNC: 38 U/L — SIGNIFICANT CHANGE UP
ANION GAP SERPL CALC-SCNC: 14 MMOL/L — SIGNIFICANT CHANGE UP (ref 5–17)
APTT BLD: 33.9 SEC — SIGNIFICANT CHANGE UP (ref 27.5–37.4)
AST SERPL-CCNC: 15 U/L — SIGNIFICANT CHANGE UP
BILIRUB SERPL-MCNC: 0.2 MG/DL — LOW (ref 0.4–2)
BUN SERPL-MCNC: 27 MG/DL — HIGH (ref 8–20)
CALCIUM SERPL-MCNC: 8.9 MG/DL — SIGNIFICANT CHANGE UP (ref 8.6–10.2)
CHLORIDE SERPL-SCNC: 95 MMOL/L — LOW (ref 98–107)
CO2 SERPL-SCNC: 28 MMOL/L — SIGNIFICANT CHANGE UP (ref 22–29)
CREAT SERPL-MCNC: 0.91 MG/DL — SIGNIFICANT CHANGE UP (ref 0.5–1.3)
GLUCOSE SERPL-MCNC: 279 MG/DL — HIGH (ref 70–115)
HCT VFR BLD CALC: 41.8 % — LOW (ref 42–52)
HGB BLD-MCNC: 13.9 G/DL — LOW (ref 14–18)
INR BLD: 0.94 RATIO — SIGNIFICANT CHANGE UP (ref 0.88–1.16)
MCHC RBC-ENTMCNC: 30.4 PG — SIGNIFICANT CHANGE UP (ref 27–31)
MCHC RBC-ENTMCNC: 33.3 G/DL — SIGNIFICANT CHANGE UP (ref 32–36)
MCV RBC AUTO: 91.5 FL — SIGNIFICANT CHANGE UP (ref 80–94)
PLATELET # BLD AUTO: 102 K/UL — LOW (ref 150–400)
POTASSIUM SERPL-MCNC: 4.3 MMOL/L — SIGNIFICANT CHANGE UP (ref 3.5–5.3)
POTASSIUM SERPL-SCNC: 4.3 MMOL/L — SIGNIFICANT CHANGE UP (ref 3.5–5.3)
PROT SERPL-MCNC: 6.5 G/DL — LOW (ref 6.6–8.7)
PROTHROM AB SERPL-ACNC: 10.3 SEC — SIGNIFICANT CHANGE UP (ref 9.8–12.7)
RBC # BLD: 4.57 M/UL — LOW (ref 4.6–6.2)
RBC # FLD: 14.3 % — SIGNIFICANT CHANGE UP (ref 11–15.6)
SODIUM SERPL-SCNC: 137 MMOL/L — SIGNIFICANT CHANGE UP (ref 135–145)
WBC # BLD: 8.4 K/UL — SIGNIFICANT CHANGE UP (ref 4.8–10.8)
WBC # FLD AUTO: 8.4 K/UL — SIGNIFICANT CHANGE UP (ref 4.8–10.8)

## 2017-04-23 PROCEDURE — 70450 CT HEAD/BRAIN W/O DYE: CPT | Mod: 26

## 2017-04-23 PROCEDURE — 99284 EMERGENCY DEPT VISIT MOD MDM: CPT

## 2017-04-23 PROCEDURE — 99233 SBSQ HOSP IP/OBS HIGH 50: CPT

## 2017-04-23 RX ORDER — DEXAMETHASONE 0.5 MG/5ML
6 ELIXIR ORAL EVERY 6 HOURS
Qty: 0 | Refills: 0 | Status: DISCONTINUED | OUTPATIENT
Start: 2017-04-23 | End: 2017-04-27

## 2017-04-23 RX ORDER — HYDRALAZINE HCL 50 MG
10 TABLET ORAL
Qty: 0 | Refills: 0 | Status: DISCONTINUED | OUTPATIENT
Start: 2017-04-23 | End: 2017-04-24

## 2017-04-23 RX ORDER — GLUCAGON INJECTION, SOLUTION 0.5 MG/.1ML
1 INJECTION, SOLUTION SUBCUTANEOUS ONCE
Qty: 0 | Refills: 0 | Status: DISCONTINUED | OUTPATIENT
Start: 2017-04-23 | End: 2017-04-27

## 2017-04-23 RX ORDER — FAMOTIDINE 10 MG/ML
20 INJECTION INTRAVENOUS
Qty: 0 | Refills: 0 | Status: DISCONTINUED | OUTPATIENT
Start: 2017-04-23 | End: 2017-04-27

## 2017-04-23 RX ORDER — DULOXETINE HYDROCHLORIDE 30 MG/1
60 CAPSULE, DELAYED RELEASE ORAL DAILY
Qty: 0 | Refills: 0 | Status: DISCONTINUED | OUTPATIENT
Start: 2017-04-23 | End: 2017-04-24

## 2017-04-23 RX ORDER — DOCUSATE SODIUM 100 MG
100 CAPSULE ORAL
Qty: 0 | Refills: 0 | Status: DISCONTINUED | OUTPATIENT
Start: 2017-04-23 | End: 2017-04-27

## 2017-04-23 RX ORDER — FOLIC ACID 0.8 MG
1 TABLET ORAL DAILY
Qty: 0 | Refills: 0 | Status: DISCONTINUED | OUTPATIENT
Start: 2017-04-23 | End: 2017-04-27

## 2017-04-23 RX ORDER — LEVETIRACETAM 250 MG/1
1000 TABLET, FILM COATED ORAL
Qty: 0 | Refills: 0 | Status: DISCONTINUED | OUTPATIENT
Start: 2017-04-23 | End: 2017-04-24

## 2017-04-23 RX ORDER — ASPIRIN/CALCIUM CARB/MAGNESIUM 324 MG
81 TABLET ORAL DAILY
Qty: 0 | Refills: 0 | Status: DISCONTINUED | OUTPATIENT
Start: 2017-04-23 | End: 2017-04-27

## 2017-04-23 RX ORDER — DEXTROSE 50 % IN WATER 50 %
12.5 SYRINGE (ML) INTRAVENOUS ONCE
Qty: 0 | Refills: 0 | Status: DISCONTINUED | OUTPATIENT
Start: 2017-04-23 | End: 2017-04-27

## 2017-04-23 RX ORDER — INSULIN LISPRO 100/ML
2 VIAL (ML) SUBCUTANEOUS
Qty: 0 | Refills: 0 | Status: DISCONTINUED | OUTPATIENT
Start: 2017-04-23 | End: 2017-04-24

## 2017-04-23 RX ORDER — DEXAMETHASONE 0.5 MG/5ML
10 ELIXIR ORAL ONCE
Qty: 0 | Refills: 0 | Status: COMPLETED | OUTPATIENT
Start: 2017-04-23 | End: 2017-04-23

## 2017-04-23 RX ORDER — ATORVASTATIN CALCIUM 80 MG/1
20 TABLET, FILM COATED ORAL AT BEDTIME
Qty: 0 | Refills: 0 | Status: DISCONTINUED | OUTPATIENT
Start: 2017-04-23 | End: 2017-04-23

## 2017-04-23 RX ORDER — INSULIN LISPRO 100/ML
VIAL (ML) SUBCUTANEOUS
Qty: 0 | Refills: 0 | Status: DISCONTINUED | OUTPATIENT
Start: 2017-04-23 | End: 2017-04-27

## 2017-04-23 RX ORDER — SODIUM CHLORIDE 9 MG/ML
1000 INJECTION, SOLUTION INTRAVENOUS
Qty: 0 | Refills: 0 | Status: DISCONTINUED | OUTPATIENT
Start: 2017-04-23 | End: 2017-04-27

## 2017-04-23 RX ORDER — ATORVASTATIN CALCIUM 80 MG/1
40 TABLET, FILM COATED ORAL AT BEDTIME
Qty: 0 | Refills: 0 | Status: DISCONTINUED | OUTPATIENT
Start: 2017-04-23 | End: 2017-04-27

## 2017-04-23 RX ORDER — DEXTROSE 50 % IN WATER 50 %
1 SYRINGE (ML) INTRAVENOUS ONCE
Qty: 0 | Refills: 0 | Status: DISCONTINUED | OUTPATIENT
Start: 2017-04-23 | End: 2017-04-27

## 2017-04-23 RX ADMIN — ATORVASTATIN CALCIUM 40 MILLIGRAM(S): 80 TABLET, FILM COATED ORAL at 22:20

## 2017-04-23 RX ADMIN — DULOXETINE HYDROCHLORIDE 60 MILLIGRAM(S): 30 CAPSULE, DELAYED RELEASE ORAL at 22:20

## 2017-04-23 RX ADMIN — LEVETIRACETAM 1000 MILLIGRAM(S): 250 TABLET, FILM COATED ORAL at 22:20

## 2017-04-23 RX ADMIN — Medication 6: at 22:36

## 2017-04-23 RX ADMIN — Medication 10 MILLIGRAM(S): at 18:02

## 2017-04-23 RX ADMIN — Medication 10 MILLIGRAM(S): at 16:09

## 2017-04-23 RX ADMIN — Medication 10 MILLIGRAM(S): at 22:19

## 2017-04-23 RX ADMIN — FAMOTIDINE 20 MILLIGRAM(S): 10 INJECTION INTRAVENOUS at 22:20

## 2017-04-23 NOTE — ED ADULT TRIAGE NOTE - CHIEF COMPLAINT QUOTE
61 yo male presents to ed from [facility] for increased right sided weakness with unknown onset; pt reports he remembers not being able to move his right arm yesterday.  patient has had right sided weakness secondary to gliosarcoma;  patient family went to visit today at 12 pm and said that his right sided weakness is worse. patient had alleged fall yesterday unknown if it was witnessed and staff note right sided weakness yesterday. pt on lovenox and aspirin 63 yo male presents to ed from [facility] for increased right sided weakness with unknown onset; pt reports he remembers not being able to move his right arm yesterday. +expressive aphasia which is not new. patient has had right sided weakness secondary to gliosarcoma/hx stroke   patient family went to visit today at 12 pm and said that his right sided weakness is worse. patient had alleged fall yesterday unknown if it was witnessed and staff note right sided weakness yesterday. pt on lovenox and aspirin

## 2017-04-23 NOTE — H&P ADULT - ASSESSMENT
62 year old male with known GBM now presenting with new right extremity hemiplegia secondary to tumour and edema vs stroke. He does have uncal herniation which is chronic though worse from before, though no midline shift is noted on CTH. He does have moderately severe asphasia which is present from before.  After discussion with his family it appears his brain malignancy is progressive, and was started on Avastin.   DMT2 with hyperglycemia exacerbated by steroids.  HTN, uncontrolled.    Poor prognosis

## 2017-04-23 NOTE — ED PROVIDER NOTE - PMH
Astrocytoma brain tumor    Brain tumor    Brain tumor    Cognitive impairment    Constipation    Depression    Diabetes mellitus of other type with hyperosmolarity, with long-term current use of insulin    Essential hypertension    GERD (gastroesophageal reflux disease)    Glioblastoma    HLD (hyperlipidemia)    IDDM (insulin dependent diabetes mellitus)    Seizure

## 2017-04-23 NOTE — H&P ADULT - NSHPREVIEWOFSYSTEMS_GEN_ALL_CORE
Unable to obtain from patient.  Family denies recent seizure, choking, coughing, breathlessness, vomiting, diarrhea or rashes

## 2017-04-23 NOTE — ED ADULT NURSE REASSESSMENT NOTE - NS ED NURSE REASSESS COMMENT FT1
Pt blood sugar 414, will medicate as per MD orders and sliding scale, MD Rodriguez made aware. Pt blood sugar 414, will medicate as per MD orders and sliding scale, MD Johnson made aware.

## 2017-04-23 NOTE — ED ADULT NURSE NOTE - PSH
Brain tumor  resection 6/2016  Brain tumor  brain tumor resection  H/O cervical spine surgery    H/O total hip arthroplasty, left

## 2017-04-23 NOTE — H&P ADULT - HISTORY OF PRESENT ILLNESS
62 year old male with PMHx significant for left sided GBM s/p surgery, XRT and chemotherapy recently started on Avastin was found by family to be unable to move right arm in Rehab today. Patient was in [facility] in [place] 2 weeks prior to this for left arm flailing as family were told that there was 'swelling of tumour and artery being blocked'.  Patient has mild to moderate degree of aphasia to start with, and is not the best historian. He says 'yes' to almost everything , so unable to really determine his complaints.  Apparently he's had weakness of the right side before, but never been unable to move

## 2017-04-23 NOTE — ED PROVIDER NOTE - PROGRESS NOTE DETAILS
family now in er states yesterday pt was using rt upper extremity and then today when they got there noticwed he wasn't unsure of onset of symptoms, pt contraindication for tpa with brain mass/head injury yesterday  awaiting ct scan to be done spoke with  hosp willadmit he was requesting icu to eval pt prior, discussed with critical care team they would like pt seen by hospitalist first and if needed they will eval  left messge with neurosurgery to eval pt spoke with dr.shah gross dmit  spoke with neurosurgery  does not feel that he can offer pt anytrhing and recommends neurology to see pt, I spoke with  recommends at least q 2 hr or 1 neurocheck pt will go to stepdown discussed with  who will eval pt and if needed he will call icu to see pt

## 2017-04-23 NOTE — ED ADULT NURSE NOTE - CHIEF COMPLAINT QUOTE
63 yo male presents to ed from [facility] for increased right sided weakness with unknown onset; pt reports he remembers not being able to move his right arm yesterday. +expressive aphasia which is not new. patient has had right sided weakness secondary to gliosarcoma/hx stroke   patient family went to visit today at 12 pm and said that his right sided weakness is worse. patient had alleged fall yesterday unknown if it was witnessed and staff note right sided weakness yesterday. pt on lovenox and aspirin

## 2017-04-23 NOTE — CONSULT NOTE ADULT - SUBJECTIVE AND OBJECTIVE BOX
62y  Male  No Known Allergies    PAST MEDICAL & SURGICAL HISTORY:  Glioblastoma  Essential hypertension  Diabetes mellitus of other type with hyperosmolarity, with long-term current use of insulin  Brain tumor  Constipation  GERD (gastroesophageal reflux disease)  Astrocytoma brain tumor  Depression  Seizure  Brain tumor  Cognitive impairment  Brain tumor, astrocytoma  Glioblastoma multiforme of brain  IDDM (insulin dependent diabetes mellitus)  HLD (hyperlipidemia)  Brain tumor: brain tumor resection  H/O cervical spine surgery  H/O total hip arthroplasty, left  Brain tumor: resection 6/2016    FAMILY HISTORY:  No pertinent family history in first degree relatives  Family history of prostate cancer in father    Patient is a 62y old  Male who presented to [facility] with a chief complaint of Right upper extremity weakness     HPI:  62 year old male with PMHx significant for left sided GBM s/p surgery, XRT and chemotherapy recently started on Avastin was found  in Rehab today to have new onset or right upper extremity weakness. Patient was in [facility] in [place] 2 weeks ago for symptom in his left arm and  family was told that there was increased  swelling of tumour and artery being blocked, which on imaging is shown to be the middle cerebral artery.    Patient has mild to moderate degree of aphasia at his baseline     Vital Signs Last 24 Hrs  T(C): 36.7, Max: 36.7 (04-23 @ 18:02)  T(F): 98.1, Max: 98.1 (04-23 @ 18:02)  HR: 104 (61 - 104)  BP: 162/103 (148/91 - 204/99)  BP(mean): --  RR: 18 (16 - 18)  SpO2: 95% (95% - 100%)  I&O's Summary      LABS                        13.9   8.4   )-----------( 102      ( 23 Apr 2017 15:01 )             41.8     PT/INR - ( 23 Apr 2017 15:01 )   PT: 10.3 sec;   INR: 0.94 ratio         PTT - ( 23 Apr 2017 15:01 )  PTT:33.9 sec  04-23    137  |  95<L>  |  27.0<H>  ----------------------------<  279<H>  4.3   |  28.0  |  0.91    Ca    8.9      23 Apr 2017 15:01    TPro  6.5<L>  /  Alb  3.8  /  TBili  0.2<L>  /  DBili  x   /  AST  15  /  ALT  38  /  AlkPhos  82  04-23    LIVER FUNCTIONS - ( 23 Apr 2017 15:01 )  Alb: 3.8 g/dL / Pro: 6.5 g/dL / ALK PHOS: 82 U/L / ALT: 38 U/L / AST: 15 U/L / GGT: x           CAPILLARY BLOOD GLUCOSE  211 (23 Apr 2017 18:32)      VENT SETTINGS     MEDICATIONS  (STANDING):  aspirin enteric coated 81milliGRAM(s) Oral daily  insulin lispro Injectable (HumaLOG) 2Unit(s) SubCutaneous three times a day before meals  insulin lispro (HumaLOG) corrective regimen sliding scale  SubCutaneous three times a day before meals  dextrose 5%. 1000milliLiter(s) IV Continuous <Continuous>  dextrose 50% Injectable 12.5Gram(s) IV Push once  dexamethasone  Injectable 6milliGRAM(s) IV Push every 6 hours  levETIRAcetam 1000milliGRAM(s) Oral two times a day  DULoxetine 60milliGRAM(s) Oral daily  atorvastatin 40milliGRAM(s) Oral at bedtime  famotidine    Tablet 20milliGRAM(s) Oral two times a day  folic acid 1milliGRAM(s) Oral daily      REVIEW OF SYSTEMS:    CONSTITUTIONAL: pt has mild to moderate expressive aphasia but is able to answer questions   EYES: No eye pain, visual disturbances, or discharge  ENMT:  No difficulty hearing, tinnitus, vertigo; No sinus or throat pain  NECK: No pain or stiffness  RESPIRATORY: No cough, wheezing, chills or hemoptysis; No shortness of breath  CARDIOVASCULAR: No chest pain, palpitations, dizziness, or leg swelling  GASTROINTESTINAL: No abdominal or epigastric pain. No nausea, vomiting, or hematemesis; No diarrhea or constipation. No melena or hematochezia.  GENITOURINARY: No dysuria, frequency, hematuria, or incontinence  NEUROLOGICAL: No headaches, memory loss, loss of strength, numbness, or tremors  SKIN: No itching, burning, rashes, or lesions   LYMPH NODES: No enlarged glands  MUSCULOSKELETAL: No joint pain or swelling; No muscle, back, or extremity pain   PSYCHIATRIC: No depression, anxiety, mood swings, or difficulty sleeping  HEME/LYMPH: No easy bruising, or bleeding gums  ALLERY AND IMMUNOLOGIC: No hives or eczema      Physicial Exam:     Constitutional: expressive aphasia   HEENT: PERRLA, EOMI, no drainage or redness  Neck: No bruits; no thyromegaly or nodules,  No JVD  Respiratory: Breath Sounds equal & clear to percussion & auscultation, no accessory muscle use  Cardiovascular: Regular rate & rhythm, normal S1, S2; no murmurs, gallops or rubs; no S3, S4  Gastrointestinal: Soft, non-tender, non distended no hepatosplenomegaly, normal bowel sounds  Extremities: No peripheral edema, No cyanosis, clubbing   Vascular: Equal and normal pulses: 2+ peripheral pulses throughout  Neurological: Pt has expressive aphasia but is alert and oriented to his self and surroundings of place and time , able to answer questions and understand the content of my conversation with him , KIMMY BENNETT cranial nerves are intact face is symmetrical Pt has right sided weakens with 3/5 strength on right and 4/5 on left in the upper extremities and bilateral lower extremities are 4+/5,  no sensory, deficits and  normal reflexes  Psychiatric: Normal mood, normal affect  Musculoskeletal: No joint pain, swelling or deformity; no limitation of movement  Skin: No rashes

## 2017-04-23 NOTE — H&P ADULT - ATTENDING COMMENTS
Lengthy discussion with patient and Jimbo (patient brother and HCP). Family understands patient prognosis is guarded, and were going to explore Hospice.   MOLST with patient denotes all measures to be take. I explained given progressively worsening GBM,  multiple hospitalizations - do not recommend extraordinary measure. patient and brother will discuss and get back. For the time being patient is a full code.  Also discussed with Neurology, Neurosurgery and ICU.

## 2017-04-23 NOTE — H&P ADULT - EYES COMMENTS
Patch over left eye. extraocular movements intact right eye, but mild temporal vision loss on confrontation. Unble to accurately determine left eye movement and vision

## 2017-04-23 NOTE — ED ADULT NURSE NOTE - FALL HARM RISK
surgery/coagulation(Bleeding disorder R/T clinical cond/anti-coags)/bones(Osteoporosis,prev fx,steroid use,metastatic bone ca/age(85 years old or older)

## 2017-04-23 NOTE — H&P ADULT - PROBLEM SELECTOR PLAN 1
Admit to stroke unit  NIH, neurochecks per protocol  Continue ASA, Statin  Permissive HTN - PRN antihypertensive.  MRI, MRA  Neurology, Neurosurgery, ICU consults

## 2017-04-23 NOTE — ED ADULT NURSE NOTE - OBJECTIVE STATEMENT
62 YOM c/o weakness s/p fall on 4/22/17. pt is s/p stroke, hx of brain tumor and possible pinched occipital artery loss of vision in left eye last week. eye patch intact. gaze unfocused. breath sounds clear equal bilaterally, abd soft nontender, bowel WNL, patient is incontinent of stool and urine, patient baseline walks to bathroom with walker or uses wheelchair when increase in weakness, left arm weakness, unable to move left upper extremity, left lower extremity is mobile + pulse all 4 extremities, 62 YOM c/o weakness s/p fall on 4/22/17. pt is s/p stroke, hx of brain tumor and possible pinched occipital artery loss of vision in left eye last week. eye patch intact. gaze unfocused. breath sounds clear equal bilaterally, abd soft nontender, bowel WNL, patient is incontinent of stool and urine, patient baseline walks to bathroom with walker or uses wheelchair when increase in weakness, right arm weakness, unable to move right upper extremity, left lower extremity is mobile + pulse all 4 extremities,

## 2017-04-23 NOTE — ED PROVIDER NOTE - OBJECTIVE STATEMENT
61 yo male sent from [facility] for evaluation of increase weakness to rt side. pt had episode of falling yesterday at [facility]  pt with known brain ca s/p resection with expressive aphasia  and intermittent rt sided hemiparessis  pt on lovenox

## 2017-04-23 NOTE — ED ADULT NURSE REASSESSMENT NOTE - NS ED NURSE REASSESS COMMENT FT1
Assuming care from previous RN, pt alert and oriented, pt w/ severe aphasia, able to respond w/ yes or no responses, LS clear and equal bilaterally, skin warm and dry, abdomen w/ previous ecchymosis present, unable to move right extremities, good strength and movement left side, patch covering left eye present, pt ate dinner w/out difficulty, showing NSR on monitor, patent 20G IV in left AC, pt denies pain/n/v at this time, THERESA Flores at bedside informing pt on plan of care, emotional support provided, pt place in position of comfort, will continue to monitor. Assuming care from previous RN, pt alert and oriented, pt w/ severe aphasia, able to respond w/ yes or no responses, LS clear and equal bilaterally, skin warm and dry, abdomen w/ previous ecchymosis present, unable to move right extremities, good strength and movement left side, patch covering left eye present, pt ate dinner w/out difficulty, showing NSR on monitor, patent 20G IV in left AC, pt denies pain/n/v at this time, THERESA Flores at bedside informing pt on plan of care, emotional support provided, pt place in position of comfort, fall safety precautions in place, will continue to monitor.

## 2017-04-23 NOTE — ED ADULT NURSE NOTE - FAMILY HISTORY
No pertinent family history in first degree relatives     Father  Still living? No  Family history of prostate cancer in father, Age at diagnosis: Age Unknown

## 2017-04-24 DIAGNOSIS — F41.9 ANXIETY DISORDER, UNSPECIFIED: ICD-10-CM

## 2017-04-24 DIAGNOSIS — R13.10 DYSPHAGIA, UNSPECIFIED: ICD-10-CM

## 2017-04-24 DIAGNOSIS — R41.89 OTHER SYMPTOMS AND SIGNS INVOLVING COGNITIVE FUNCTIONS AND AWARENESS: ICD-10-CM

## 2017-04-24 DIAGNOSIS — Z51.5 ENCOUNTER FOR PALLIATIVE CARE: ICD-10-CM

## 2017-04-24 DIAGNOSIS — E78.5 HYPERLIPIDEMIA, UNSPECIFIED: ICD-10-CM

## 2017-04-24 DIAGNOSIS — F32.9 MAJOR DEPRESSIVE DISORDER, SINGLE EPISODE, UNSPECIFIED: ICD-10-CM

## 2017-04-24 LAB
ANION GAP SERPL CALC-SCNC: 15 MMOL/L — SIGNIFICANT CHANGE UP (ref 5–17)
BUN SERPL-MCNC: 26 MG/DL — HIGH (ref 8–20)
CALCIUM SERPL-MCNC: 9.2 MG/DL — SIGNIFICANT CHANGE UP (ref 8.6–10.2)
CHLORIDE SERPL-SCNC: 100 MMOL/L — SIGNIFICANT CHANGE UP (ref 98–107)
CHOLEST SERPL-MCNC: 234 MG/DL — HIGH (ref 110–199)
CO2 SERPL-SCNC: 25 MMOL/L — SIGNIFICANT CHANGE UP (ref 22–29)
CREAT SERPL-MCNC: 0.76 MG/DL — SIGNIFICANT CHANGE UP (ref 0.5–1.3)
GLUCOSE SERPL-MCNC: 283 MG/DL — HIGH (ref 70–115)
HDLC SERPL-MCNC: 58 MG/DL — SIGNIFICANT CHANGE UP
LIPID PNL WITH DIRECT LDL SERPL: 162 MG/DL — SIGNIFICANT CHANGE UP
POTASSIUM SERPL-MCNC: 4.1 MMOL/L — SIGNIFICANT CHANGE UP (ref 3.5–5.3)
POTASSIUM SERPL-SCNC: 4.1 MMOL/L — SIGNIFICANT CHANGE UP (ref 3.5–5.3)
SODIUM SERPL-SCNC: 140 MMOL/L — SIGNIFICANT CHANGE UP (ref 135–145)
TOTAL CHOLESTEROL/HDL RATIO MEASUREMENT: 4 RATIO — SIGNIFICANT CHANGE UP (ref 3.4–9.6)
TRIGL SERPL-MCNC: 70 MG/DL — SIGNIFICANT CHANGE UP (ref 10–200)

## 2017-04-24 PROCEDURE — 99233 SBSQ HOSP IP/OBS HIGH 50: CPT

## 2017-04-24 PROCEDURE — 99222 1ST HOSP IP/OBS MODERATE 55: CPT

## 2017-04-24 RX ORDER — LABETALOL HCL 100 MG
10 TABLET ORAL EVERY 4 HOURS
Qty: 0 | Refills: 0 | Status: DISCONTINUED | OUTPATIENT
Start: 2017-04-24 | End: 2017-04-27

## 2017-04-24 RX ORDER — FAMOTIDINE 10 MG/ML
1 INJECTION INTRAVENOUS
Qty: 0 | Refills: 0 | COMMUNITY

## 2017-04-24 RX ORDER — LANOLIN ALCOHOL/MO/W.PET/CERES
1 CREAM (GRAM) TOPICAL
Qty: 0 | Refills: 0 | COMMUNITY

## 2017-04-24 RX ORDER — INSULIN GLARGINE 100 [IU]/ML
24 INJECTION, SOLUTION SUBCUTANEOUS
Qty: 0 | Refills: 0 | COMMUNITY

## 2017-04-24 RX ORDER — ALPRAZOLAM 0.25 MG
0.25 TABLET ORAL THREE TIMES A DAY
Qty: 0 | Refills: 0 | Status: DISCONTINUED | OUTPATIENT
Start: 2017-04-24 | End: 2017-04-25

## 2017-04-24 RX ORDER — SIMVASTATIN 20 MG/1
1 TABLET, FILM COATED ORAL
Qty: 0 | Refills: 0 | COMMUNITY

## 2017-04-24 RX ORDER — ASPIRIN/CALCIUM CARB/MAGNESIUM 324 MG
1 TABLET ORAL
Qty: 0 | Refills: 0 | COMMUNITY

## 2017-04-24 RX ORDER — HYDRALAZINE HCL 50 MG
10 TABLET ORAL
Qty: 0 | Refills: 0 | Status: DISCONTINUED | OUTPATIENT
Start: 2017-04-24 | End: 2017-04-27

## 2017-04-24 RX ORDER — LEVETIRACETAM 250 MG/1
1.5 TABLET, FILM COATED ORAL
Qty: 0 | Refills: 0 | COMMUNITY

## 2017-04-24 RX ORDER — LEVETIRACETAM 250 MG/1
1500 TABLET, FILM COATED ORAL
Qty: 0 | Refills: 0 | Status: DISCONTINUED | OUTPATIENT
Start: 2017-04-24 | End: 2017-04-27

## 2017-04-24 RX ORDER — DULOXETINE HYDROCHLORIDE 30 MG/1
30 CAPSULE, DELAYED RELEASE ORAL
Qty: 0 | Refills: 0 | Status: DISCONTINUED | OUTPATIENT
Start: 2017-04-24 | End: 2017-04-27

## 2017-04-24 RX ORDER — FOLIC ACID 0.8 MG
1 TABLET ORAL
Qty: 0 | Refills: 0 | COMMUNITY

## 2017-04-24 RX ORDER — DULOXETINE HYDROCHLORIDE 30 MG/1
60 CAPSULE, DELAYED RELEASE ORAL
Qty: 0 | Refills: 0 | COMMUNITY

## 2017-04-24 RX ORDER — FOLIC ACID 0.8 MG
0 TABLET ORAL
Qty: 0 | Refills: 0 | COMMUNITY

## 2017-04-24 RX ORDER — INSULIN LISPRO 100/ML
5 VIAL (ML) SUBCUTANEOUS
Qty: 0 | Refills: 0 | Status: DISCONTINUED | OUTPATIENT
Start: 2017-04-24 | End: 2017-04-25

## 2017-04-24 RX ORDER — INSULIN GLARGINE 100 [IU]/ML
15 INJECTION, SOLUTION SUBCUTANEOUS AT BEDTIME
Qty: 0 | Refills: 0 | Status: DISCONTINUED | OUTPATIENT
Start: 2017-04-24 | End: 2017-04-25

## 2017-04-24 RX ORDER — SENNA PLUS 8.6 MG/1
2 TABLET ORAL AT BEDTIME
Qty: 0 | Refills: 0 | Status: DISCONTINUED | OUTPATIENT
Start: 2017-04-24 | End: 2017-04-27

## 2017-04-24 RX ORDER — AZTREONAM 2 G
0 VIAL (EA) INJECTION
Qty: 0 | Refills: 0 | COMMUNITY

## 2017-04-24 RX ORDER — INSULIN GLARGINE 100 [IU]/ML
32 INJECTION, SOLUTION SUBCUTANEOUS
Qty: 0 | Refills: 0 | COMMUNITY

## 2017-04-24 RX ORDER — LEVETIRACETAM 250 MG/1
1 TABLET, FILM COATED ORAL
Qty: 0 | Refills: 0 | COMMUNITY

## 2017-04-24 RX ADMIN — DULOXETINE HYDROCHLORIDE 30 MILLIGRAM(S): 30 CAPSULE, DELAYED RELEASE ORAL at 18:32

## 2017-04-24 RX ADMIN — Medication 2: at 13:12

## 2017-04-24 RX ADMIN — Medication 6 MILLIGRAM(S): at 06:42

## 2017-04-24 RX ADMIN — Medication 6 MILLIGRAM(S): at 00:01

## 2017-04-24 RX ADMIN — FAMOTIDINE 20 MILLIGRAM(S): 10 INJECTION INTRAVENOUS at 06:42

## 2017-04-24 RX ADMIN — Medication 5 UNIT(S): at 18:06

## 2017-04-24 RX ADMIN — SENNA PLUS 2 TABLET(S): 8.6 TABLET ORAL at 23:25

## 2017-04-24 RX ADMIN — Medication 10 MILLIGRAM(S): at 09:43

## 2017-04-24 RX ADMIN — INSULIN GLARGINE 15 UNIT(S): 100 INJECTION, SOLUTION SUBCUTANEOUS at 23:26

## 2017-04-24 RX ADMIN — Medication 6 MILLIGRAM(S): at 13:10

## 2017-04-24 RX ADMIN — Medication 1 TABLET(S): at 13:11

## 2017-04-24 RX ADMIN — Medication 1 MILLIGRAM(S): at 13:11

## 2017-04-24 RX ADMIN — Medication 2: at 09:26

## 2017-04-24 RX ADMIN — Medication 6 MILLIGRAM(S): at 18:07

## 2017-04-24 RX ADMIN — Medication 75 MILLIGRAM(S): at 18:09

## 2017-04-24 RX ADMIN — FAMOTIDINE 20 MILLIGRAM(S): 10 INJECTION INTRAVENOUS at 18:08

## 2017-04-24 RX ADMIN — LEVETIRACETAM 1000 MILLIGRAM(S): 250 TABLET, FILM COATED ORAL at 09:25

## 2017-04-24 RX ADMIN — Medication 6 MILLIGRAM(S): at 23:26

## 2017-04-24 RX ADMIN — ATORVASTATIN CALCIUM 40 MILLIGRAM(S): 80 TABLET, FILM COATED ORAL at 23:25

## 2017-04-24 RX ADMIN — Medication 2 UNIT(S): at 13:15

## 2017-04-24 RX ADMIN — LEVETIRACETAM 1500 MILLIGRAM(S): 250 TABLET, FILM COATED ORAL at 18:32

## 2017-04-24 RX ADMIN — Medication 3: at 18:06

## 2017-04-24 RX ADMIN — Medication 81 MILLIGRAM(S): at 13:10

## 2017-04-24 NOTE — PROGRESS NOTE ADULT - PROBLEM SELECTOR PLAN 7
DASH/TLC diet  keeping SBP on the higher side  Hydralazine 10mg IVP Q3H PRN   ASA 81mg PO QD DASH/TLC diet  keeping SBP on the higher side  Hydralazine 10mg IVP Q3H PRN w/parameters  Labetalol 10mg IVP Q4H PRN w/parameters  ASA 81mg PO QD

## 2017-04-24 NOTE — PROGRESS NOTE ADULT - SUBJECTIVE AND OBJECTIVE BOX
Patient is a 62y old  Male who presents with a chief complaint of Right arm went limp (23 Apr 2017 19:54)      HPI:  62 year old male with PMHx significant for left sided GBM s/p surgery, XRT and chemotherapy recently started on Avastin was found by family to be unable to move right arm in Rehab today. Patient was in [facility] in [place] 2 weeks prior to this for left arm flailing as family were told that there was 'swelling of tumour and artery being blocked'.  Patient has mild to moderate degree of aphasia to start with, and is not the best historian. He says 'yes' to almost everything , so unable to really determine his complaints.  Apparently he's had weakness of the right side before, but never been unable to move     Pt. with expressive aphasia, poor historian, and mild dysarthria. When asked r.e any c/o denies at present. + right sided weakness RUE>RLE    FAMILY HISTORY:  No pertinent family history in first degree relatives  Family history of prostate cancer in father      PAST MEDICAL & SURGICAL HISTORY:  Glioblastoma  Essential hypertension  Diabetes mellitus of other type with hyperosmolarity, with long-term current use of insulin  Brain tumor  Constipation  GERD (gastroesophageal reflux disease)  Astrocytoma brain tumor  Depression  Seizure  Brain tumor  Cognitive impairment  Brain tumor, astrocytoma  Glioblastoma multiforme of brain  IDDM (insulin dependent diabetes mellitus)  HLD (hyperlipidemia)  Brain tumor: brain tumor resection  H/O cervical spine surgery  H/O total hip arthroplasty, left  Brain tumor: resection 6/2016      Allergies    No Known Allergies      Vital Signs Last 24 Hrs  T(C): 36.3, Max: 36.9 (04-23 @ 22:37)  T(F): 97.3, Max: 98.5 (04-24 @ 07:30)  HR: 91 (61 - 109)  BP: 157/100 (141/99 - 215/139)  BP(mean): --  RR: 18 (12 - 20)  SpO2: 98% (95% - 100%)      LABS:                        13.9   8.4   )-----------( 102      ( 23 Apr 2017 15:01 )             41.8     LIVER FUNCTIONS - ( 23 Apr 2017 15:01 )  Alb: 3.8 g/dL / Pro: 6.5 g/dL / ALK PHOS: 82 U/L / ALT: 38 U/L / AST: 15 U/L / GGT: x           PT/INR - ( 23 Apr 2017 15:01 )   PT: 10.3 sec;   INR: 0.94 ratio         PTT - ( 23 Apr 2017 15:01 )  PTT:33.9 sec      RADIOLOGY & ADDITIONAL STUDIES:     CT BRAIN  Mildly increased left uncal herniation which may be   combination of edema and tumor. Left hemispheric mass as described. No   hemorrhage.    MEDICATIONS:  hydrALAZINE Injectable 10milliGRAM(s) IV Push every 3 hours PRN  aspirin enteric coated 81milliGRAM(s) Oral daily  insulin lispro (HumaLOG) corrective regimen sliding scale  SubCutaneous three times a day before meals  dextrose 5%. 1000milliLiter(s) IV Continuous <Continuous>  dextrose Gel 1Dose(s) Oral once PRN  dextrose 50% Injectable 12.5Gram(s) IV Push once  glucagon  Injectable 1milliGRAM(s) IntraMuscular once PRN  dexamethasone  Injectable 6milliGRAM(s) IV Push every 6 hours  atorvastatin 40milliGRAM(s) Oral at bedtime  famotidine    Tablet 20milliGRAM(s) Oral two times a day  docusate sodium 100milliGRAM(s) Oral two times a day PRN  folic acid 1milliGRAM(s) Oral daily  labetalol Injectable 10milliGRAM(s) IV Push every 4 hours PRN  pregabalin 75milliGRAM(s) Oral two times a day  DULoxetine 30milliGRAM(s) Oral two times a day  levETIRAcetam 1500milliGRAM(s) Oral two times a day  senna 2Tablet(s) Oral at bedtime  trimethoprim  160 mG/sulfamethoxazole 800 mG 1Tablet(s) Oral <User Schedule>  ALPRAZolam 0.25milliGRAM(s) Oral three times a day PRN  insulin lispro Injectable (HumaLOG) 5Unit(s) SubCutaneous three times a day before meals  insulin glargine Injectable (LANTUS) 15Unit(s) SubCutaneous at bedtime

## 2017-04-24 NOTE — ED ADULT NURSE REASSESSMENT NOTE - NS ED NURSE REASSESS COMMENT FT1
Pt. received at 1930. Awake and alert, able to follow commands, confirm name and date of birth with slurred speech. complete right sided paralysis and loss of sensation, able to move left sided extremities without difficulty. Full NIHH stroke scale performed. see chart for details. VSS on bedside cardiac monitor, environment safe, continue to monitor and maintain safety.

## 2017-04-24 NOTE — ED ADULT NURSE REASSESSMENT NOTE - COMFORT CARE
plan of care explained/side rails up/warm blanket provided/repositioned/wait time explained/treatment delay explained

## 2017-04-24 NOTE — CONSULT NOTE ADULT - PROBLEM SELECTOR RECOMMENDATION 9
admit to stoke unit  Q 2 hour neuro checks  NIH scale   permissive hypertenssion keeping SBP between 150-170  follow up MRI/MRA  continue aspirn and statin  contine keppra for seizure prophylaxis   DVT and GI prophylaxis
No neurosx intervention can be offered. Dr. Barber neurologist recommends comfort care. Continue Decadron.

## 2017-04-24 NOTE — ED ADULT NURSE REASSESSMENT NOTE - NS ED NURSE REASSESS COMMENT FT1
Late entry : Pt received in the bed, resting comfortably, no verbalizing any complaints at this time, requesting to " I want to go back to sleep" , unable to perform NIHSS at this time, will reassess in the later time, will continue to monitor

## 2017-04-24 NOTE — SWALLOW BEDSIDE ASSESSMENT ADULT - ASR SWALLOW ASPIRATION MONITOR
cough/change of breathing pattern/gurgly voice/pneumonia/upper respiratory infection/position upright (90Y)/throat clearing/fever/oral hygiene

## 2017-04-24 NOTE — ED ADULT NURSE REASSESSMENT NOTE - NS ED NURSE REASSESS COMMENT FT1
Pt resting comfortably in stretcher in position of comfort, resp even and unlabored, sat @ 95% on room air, showing NSR on monitor, pt offers no complaints at this time, will continue to monitor.

## 2017-04-24 NOTE — ED ADULT NURSE REASSESSMENT NOTE - NS ED NURSE REASSESS COMMENT FT1
Pt and linens changed on stretcher, pt placed in position of comfort, denies pain at this time, denies n/v, able to tolerate PO food and fluids, showing sinus tach on monitor, warm blankets applied, will continue to monitor. Pt and linens changed on stretcher, pt placed in position of comfort, denies pain at this time, denies n/v, able to tolerate PO food and fluids, showing NSR on monitor, warm blankets applied, will continue to monitor.

## 2017-04-24 NOTE — PROGRESS NOTE ADULT - PROBLEM SELECTOR PLAN 3
Neurology consult appreciated  Neurosurgery -- no surgical intervention can be offered  Seizure precautions  Neurochecks  Keppra 1000mg PO BID  MRI -- will need to F/U w/pt's Neurologist  as soon as results available  MRA  Dexamethasone 6mg IVP Q6H  Palliative Consult  Management as per [facility] Oncologists  DNR/DNI was suggested to family Neurology consult appreciated  Neurosurgery -- no surgical intervention can be offered  Seizure precautions  Neurochecks  Keppra 1000mg PO BID  Lyrica 75mg PO BID   MRI -- will need to F/U w/pt's Neurologist  as soon as results available  MRA  Dexamethasone 6mg IVP Q6H  Palliative Consult  Management as per [facility] Oncologists  DNR/DNI was suggested to family

## 2017-04-24 NOTE — ED ADULT NURSE REASSESSMENT NOTE - NS ED NURSE REASSESS COMMENT FT1
Pt reeval and noticed to be hypertensive 205/114, 10mg of Hydralazine IV given as per MD order , pt also is c/o slight headache. Yuly CARDENAS at the bedside to eval pt, Pt noticed to have severe dysarthria most likely old - maybe worsening , symptoms consulted with PA will obtain swallow eval , will continue to monitor

## 2017-04-24 NOTE — CONSULT NOTE ADULT - SUBJECTIVE AND OBJECTIVE BOX
CHIEF COMPLAINT: increased right side weaknesss    HPI: 62yMale  62 year old male with PMHx significant for left sided GBM s/p surgery, XRT and chemotherapy recently started on Avastin was found by family to be unable to move right arm in Rehab today. Patient was in [facility] in [place] 2 weeks prior to this for left arm flailing as family were told that there was 'swelling of tumour and artery being blocked'.  Patient has mild to moderate degree of aphasia to start with, and is not the best historian. He says 'yes' to almost everything , so unable to really determine his complaints.  Apparently he's had weakness of the right side before, but never been unable to move  He has been awake and stable overnigh. States he has moderate headache but declines pain meds        PAST MEDICAL & SURGICAL HISTORY:  Glioblastoma  Essential hypertension  Diabetes mellitus of other type with hyperosmolarity, with long-term current use of insulin  Brain tumor  Constipation  GERD (gastroesophageal reflux disease)  Astrocytoma brain tumor  Depression  Seizure  Brain tumor  Cognitive impairment  Brain tumor, astrocytoma  Glioblastoma multiforme of brain  IDDM (insulin dependent diabetes mellitus)  HLD (hyperlipidemia)  Brain tumor: brain tumor resection  H/O cervical spine surgery  H/O total hip arthroplasty, left  Brain tumor: resection 6/2016    MEDICATIONS  (STANDING):  aspirin enteric coated 81milliGRAM(s) Oral daily  insulin lispro Injectable (HumaLOG) 2Unit(s) SubCutaneous three times a day before meals  insulin lispro (HumaLOG) corrective regimen sliding scale  SubCutaneous three times a day before meals  dextrose 5%. 1000milliLiter(s) IV Continuous <Continuous>  dextrose 50% Injectable 12.5Gram(s) IV Push once  dexamethasone  Injectable 6milliGRAM(s) IV Push every 6 hours  levETIRAcetam 1000milliGRAM(s) Oral two times a day  DULoxetine 60milliGRAM(s) Oral daily  atorvastatin 40milliGRAM(s) Oral at bedtime  famotidine    Tablet 20milliGRAM(s) Oral two times a day  folic acid 1milliGRAM(s) Oral daily    MEDICATIONS  (PRN):  hydrALAZINE Injectable 10milliGRAM(s) IV Push every 3 hours PRN sbp> 180 diastolic >95  dextrose Gel 1Dose(s) Oral once PRN Blood Glucose LESS THAN 70 milliGRAM(s)/deciliter  glucagon  Injectable 1milliGRAM(s) IntraMuscular once PRN Glucose LESS THAN 70 milligrams/deciliter  docusate sodium 100milliGRAM(s) Oral two times a day PRN Constipation    Allergies    No Known Allergies    Intolerances        FAMILY HISTORY:  No pertinent family history in first degree relatives  Family history of prostate cancer in father          SOCIAL HISTORY:    Tobacco:    Alcohol:    Drugs:          REVIEW OF SYSTEMS:    Relevant systems are negative except as noted in the chart, HPI, and PMH      VITAL SIGNS:  Vital Signs Last 24 Hrs  T(C): 36.9, Max: 36.9 (04-23 @ 22:37)  T(F): 98.5, Max: 98.5 (04-24 @ 07:30)  HR: 78 (61 - 109)  BP: 180/139 (141/99 - 204/99)  BP(mean): --  RR: 16 (12 - 20)  SpO2: 96% (95% - 100%)    PHYSICAL EXAMINATION:    General: Well-developed, well nourished, in no acute distress.  Cardiac:  Regular rate and rhythm. No carotid bruits appreciated.  Eyes: Fundoscopic examination was deferred.  Neurologic:  - Mental Status:  Alert, awake, o; Speech is global aphasia. Follows some commands and can communicate. Language is normal.     Cranial Nerves II-XII:    II:   Visual fields are full to confrontation via right eye Decreased acuity on right; Pupils are equal but nonreactive on left.   III, IV, VI:  Left ptosis with eye deviated out (CNIII palsy  V:  Facial sensation is intact in the V1-V3 distribution bilaterally.  VII:  Face - right facial weakness, dysarthria  VIII:  - Motor:  Strength- right hemiplegia with increased reflexes, tone and Babinski     LABS:                          13.9   8.4   )-----------( 102      ( 23 Apr 2017 15:01 )             41.8     24 Apr 2017 06:26    140    |  100    |  26.0   ----------------------------<  283    4.1     |  25.0   |  0.76     Ca    9.2        24 Apr 2017 06:26    TPro  6.5    /  Alb  3.8    /  TBili  0.2    /  DBili  x      /  AST  15     /  ALT  38     /  AlkPhos  82     23 Apr 2017 15:01    LIVER FUNCTIONS - ( 23 Apr 2017 15:01 )  Alb: 3.8 g/dL / Pro: 6.5 g/dL / ALK PHOS: 82 U/L / ALT: 38 U/L / AST: 15 U/L / GGT: x           PT/INR - ( 23 Apr 2017 15:01 )   PT: 10.3 sec;   INR: 0.94 ratio         PTT - ( 23 Apr 2017 15:01 )  PTT:33.9 sec      RADIOLOGY & ADDITIONAL STUDIES:    CT-Mildly increased left uncal herniation which may be   combination of edema and tumor. Left hemispheric mass as described. No   hemorrhage.        IMPRESSION:    Malignant brain tumor- GBM  Worsening mass effect with increased neurologic signs and symptoms- CN III palsy, right hemiplegia and aphasia  Poor prognosis for significant functional recovery    Rec:  Management as per the patient's outpatient physicians at [facility]  Palliative and comfort care  Would encourage DNR/DNR, patient is at risk for progression of herniation and associated clinical decline  Decadron/ Keppra  Continue neurochecks  MRI, MRA ordered by hospitalist - findings unlikely to affect treatment or prognosis  Nothing more to directly offer

## 2017-04-24 NOTE — PROGRESS NOTE ADULT - PROBLEM SELECTOR PLAN 6
Accuchecks  HISS w/corrective coverage  Humalog 2 units SQ before each meal  Consistent carbohydrate diet Accuchecks  HISS w/corrective coverage  Humalog 2 units SQ before each meal (pt. on Decadron)  Lantus 15 units SQ QHS  Consistent carbohydrate diet

## 2017-04-24 NOTE — CONSULT NOTE ADULT - SUBJECTIVE AND OBJECTIVE BOX
Palliative Medicine Initial Consultation Note    HPI:  62yr man, hx of GBM, s/p resection in 6/2016 tx with radiation and chemotherapy. Patient was in rehab and found unable to move right arm. He was in [facility]  approximately  prior to that. Family ( brother and sister in law) state that he has been in and out of the hospital related to his cancer, over the last few months. He was recently put on Avastin, prior to that he was on TMZ.  Ct showed progression of disease     PERTINENT PMH REVIEWED:  [x ] YES [ ] NO         Astrocytoma brain tumor    Brain tumor    Brain tumor    Cognitive impairment    Constipation    Depression    Diabetes mellitus of other type with hyperosmolarity, with long-term current use of insulin    Essential hypertension    GERD (gastroesophageal reflux disease)    Glioblastoma    HLD (hyperlipidemia)    IDDM (insulin dependent diabetes mellitus)    Seizure.    Past Surgical History:  Brain tumor  resection 6/2016  Brain tumor  brain tumor resection  H/O cervical spine surgery    H/O total hip arthroplasty, left.      SOCIAL HISTORY:                                    Admitted from: [ ] home [ ] SNF _________ [ x] rehab   . Wife living in [place].   Brother Jimbo New is HCP  Patient has been living with his brother with whom he and his wife has been caring for him.                    FAMILY HISTORY:  No pertinent family history in first degree relatives  Family history of prostate cancer in father      Baseline ADLs (prior to admission):  Independent [ ] moderately [ ] fully   Dependent   [ ] moderately [x ]fully    MEDICATIONS  (STANDING):  aspirin enteric coated 81milliGRAM(s) Oral daily  insulin lispro Injectable (HumaLOG) 2Unit(s) SubCutaneous three times a day before meals  insulin lispro (HumaLOG) corrective regimen sliding scale  SubCutaneous three times a day before meals  dextrose 5%. 1000milliLiter(s) IV Continuous <Continuous>  dextrose 50% Injectable 12.5Gram(s) IV Push once  dexamethasone  Injectable 6milliGRAM(s) IV Push every 6 hours  atorvastatin 40milliGRAM(s) Oral at bedtime  famotidine    Tablet 20milliGRAM(s) Oral two times a day  folic acid 1milliGRAM(s) Oral daily  pregabalin 75milliGRAM(s) Oral two times a day  DULoxetine 30milliGRAM(s) Oral two times a day  levETIRAcetam 1500milliGRAM(s) Oral two times a day  senna 2Tablet(s) Oral at bedtime  trimethoprim  160 mG/sulfamethoxazole 800 mG 1Tablet(s) Oral <User Schedule>    MEDICATIONS  (PRN):  hydrALAZINE Injectable 10milliGRAM(s) IV Push every 3 hours PRN sbp> 180 diastolic >95  dextrose Gel 1Dose(s) Oral once PRN Blood Glucose LESS THAN 70 milliGRAM(s)/deciliter  glucagon  Injectable 1milliGRAM(s) IntraMuscular once PRN Glucose LESS THAN 70 milligrams/deciliter  docusate sodium 100milliGRAM(s) Oral two times a day PRN Constipation  labetalol Injectable 10milliGRAM(s) IV Push every 4 hours PRN SBP>180, DBP>95  ALPRAZolam 0.25milliGRAM(s) Oral three times a day PRN anxiety      Allergies    No Known Allergies    Intolerances        REVIEW OF SYSTEMS   See HPI  denies pain    [ ] Unable to obtain due to poor mentation     General: no fevers, no fatigue, no loss of appetite    Skin: no rashes, skin changes  	  Ophthalmologic: left eye patch, right eye grossly nml  	  ENMT:	no sore throat, no ear pain    Respiratory and Thorax: no cough,  no  SOB 	    Cardiovascular:	no chest pain, no leg swelling    Gastrointestinal:	no  n/v/d,c    Genitourinary:	no FUD    Musculoskeletal: no muscle pain	    Neurological: right sided weakness    Hematology/Lymphatics: no petechia or purpura	    Endocrine: no polyuria, no polydipsia	      Karnofsky Performance Score/Palliative Performance Status Version 2:  30  %    Vital Signs Last 24 Hrs  T(C): 36.3, Max: 36.9 (04-23 @ 22:37)  T(F): 97.3, Max: 98.5 (04-24 @ 07:30)  HR: 102 (61 - 109)  BP: 152/97 (141/99 - 215/139)  BP(mean): --  RR: 18 (12 - 20)  SpO2: 99% (95% - 100%)    PHYSICAL EXAM: WD man, NAD, Aphasic    General: [ x] alert  [ ] oriented x ____ [ ] lethargic [ ] agitated                  [ ] cachexia  [ ] nonverbal  [ ] coma    HEENT: left eye patch, NCAT    Lungs: [x ] comfortable [ ] tachypnea/labored breathing  [ ] excessive secretions    CV: [ x] normal  [ ] tachycardia    GI: [ x] normal  [ ] distended  [ ] tender  [ ] no BS               [ ] PEG/NG/OG tube    : [ x] normal  [ ] incontinent  [ ] oliguria/anuria  [ ] dia    MSK:     Skin: [ ] normal  [ ] pressure ulcers- Stage_____  [ x] no rash    LABS:                        13.9   8.4   )-----------( 102      ( 23 Apr 2017 15:01 )             41.8     04-24    140  |  100  |  26.0<H>  ----------------------------<  283<H>  4.1   |  25.0  |  0.76    Ca    9.2      24 Apr 2017 06:26    TPro  6.5<L>  /  Alb  3.8  /  TBili  0.2<L>  /  DBili  x   /  AST  15  /  ALT  38  /  AlkPhos  82  04-23    PT/INR - ( 23 Apr 2017 15:01 )   PT: 10.3 sec;   INR: 0.94 ratio         PTT - ( 23 Apr 2017 15:01 )  PTT:33.9 sec    I&O's Summary      RADIOLOGY & ADDITIONAL STUDIES:       EXAM:  CT BRAIN                          PROCEDURE DATE:  04/23/2017        INTERPRETATION:  CT BRAIN     Axial sections were obtained from base to vertex without contrast.    Clinical History: Brain cancer, mental status change, possible hemorrhage    Comparison: 4/15    FINDINGS:    Again redemonstrated is a left corona radiata/basal ganglia mass   extending into the medial temporal lobe and uncinate process. Extensive   vasogenic edema is noted without significant midline shift, grossly   stable. There is mildly increased herniation of the left uncus and   obliteration of the left suprasellar cistern which may be due to   combination of tumor and edema. There is no hemorrhage. No significant   hydrocephalus.    The visualized sinuses and mastoid air cells are unremarkable.    Bones demonstrate left pterional craniotomy.     IMPRESSION: Mildly increased left uncal herniation which may be   combination of edema and tumor. Left hemispheric mass as described. No   hemorrhage.      ADVANCE DIRECTIVES:  [ x] YES [ ] NO   DNR [ ] YES [ x] NO  Completed on:                     MOLST  [ ] YES [ x] NO   Completed on:  Living Will  [ ] YES [x ] NO   Completed on:        Thank you for the opportunity to assist with the care of this patient.   [facility] Consult Service [phone].

## 2017-04-24 NOTE — PROGRESS NOTE ADULT - PROBLEM SELECTOR PLAN 4
Keppra 1000mg PO BID  [facility]iz[facility]  [facility]  [facility] consult appreciated  MRI -- will need to F/U w/pt's Neurologist  as soon as results available  MRA Keppra 1000mg PO BID  Lyrica 75mg PO BID   Seizure precautions  Neurochecks  Neurology consult appreciated  MRI -- will need to F/U w/pt's Neurologist  as soon as results available  MRA

## 2017-04-24 NOTE — SWALLOW BEDSIDE ASSESSMENT ADULT - SLP GENERAL OBSERVATIONS
Pt received & seen bedside, +awake/alert, +aphasic, +family (brother & sister-in-law) present, +O2 via nasal canula sats: (98%)

## 2017-04-24 NOTE — PROGRESS NOTE ADULT - PROBLEM SELECTOR PLAN 10
DVTp-- VCD  GI p-- Pepcid 20mg PO BID  Constipation-- Colace 100mg PO BID PRN  Folic acid 1mg PO QD PCP prophylaxis-- Bactrim DS  DVTp-- VCD  GI p-- Pepcid 20mg PO BID  Constipation-- Colace 100mg PO BID PRN + Senna QHS  Folic acid 1mg PO QD

## 2017-04-24 NOTE — CONSULT NOTE ADULT - PROBLEM SELECTOR RECOMMENDATION 5
Spoke initially to brother ( HCP -form in chart) and sister in law. They know of his condition and his poor prognosis. Patient has been living with brother for the last few months and caring for him. Wife is in [place].  Brother is  interested in hospice care, particularly [facility], as they had a family member with [facility]. They state the patient has been told of his condition but not sure how much he understands as he is aphasic.   Spoke with patient along with family. He nods when I spoke to him about his condition. Discussed advance directives/CPR. He wishes to think about it.  Spoke to brother separately, who will speak to patient about hospice care.  [facility] hosp[facility] referral made.

## 2017-04-24 NOTE — CONSULT NOTE ADULT - PROBLEM SELECTOR RECOMMENDATION 2
appretiate neurosurgery and neurology consults  continue steroids  continue neuro checks
Cont Gideon.

## 2017-04-24 NOTE — CONSULT NOTE ADULT - ASSESSMENT
This is a 62 year old male with a known brain tumor s/p resection of Glioblastoma who came to ER from skilled facility with new onset right upper extremity weakness. On imaging,  the space occupying mass is in close proximity to middle cerebral artery and increase swelling may be compressing the MCA causing the current new onset of symptoms. Neurosurgery was called and  stated that surgery would not improve the patients poor prognosis and there is no surgical intervention to offer at this time.  Other than new onset of right upper extremity weakness and slight increase in selling of the mass noted on imaging in compared to previous tests, the patient is otherwise intact and stable from a neurological perspective, is hemodynamically stable with vital signs, no report of seizure activity or lethargy / altered mental status.    The patient does not meet ICU criteria at this time would not have any added benefit from being admitted to ICU compared to Stepdown/stroke unit with Q 2 hour neuro checks. Would recommend continue talks with patient and family concerning comfort care and hospice and consider palliative care consult to help.
62yr man with GBM s/p resection, radiation and chemotherapy and recently placed on Avastin. Patient with continued progression of disease, worsening mass affect, right hemiparesis, aphasic.  Poor prognosis.

## 2017-04-24 NOTE — ED ADULT NURSE REASSESSMENT NOTE - NS ED NURSE REASSESS COMMENT FT1
Spoke w/ MD Rodriguez regarding pt blood pressure, advised not to administer PRN hydralazine at this time, will continue to monitor. Spoke w/ MD Johnson regarding pt blood pressure, advised not to administer PRN hydralazine at this time, will continue to monitor.

## 2017-04-24 NOTE — SWALLOW BEDSIDE ASSESSMENT ADULT - SWALLOW EVAL: RECOMMENDED FEEDING/EATING TECHNIQUES
position upright (90 degrees)/maintain upright posture during/after eating for 30 mins/small sips/bites/allow for swallow between intakes

## 2017-04-25 PROCEDURE — 99233 SBSQ HOSP IP/OBS HIGH 50: CPT

## 2017-04-25 PROCEDURE — 99497 ADVNCD CARE PLAN 30 MIN: CPT | Mod: 25

## 2017-04-25 PROCEDURE — 70551 MRI BRAIN STEM W/O DYE: CPT | Mod: 26

## 2017-04-25 PROCEDURE — 99222 1ST HOSP IP/OBS MODERATE 55: CPT | Mod: GC

## 2017-04-25 RX ORDER — INSULIN LISPRO 100/ML
10 VIAL (ML) SUBCUTANEOUS
Qty: 0 | Refills: 0 | Status: DISCONTINUED | OUTPATIENT
Start: 2017-04-25 | End: 2017-04-27

## 2017-04-25 RX ORDER — LOSARTAN POTASSIUM 100 MG/1
25 TABLET, FILM COATED ORAL DAILY
Qty: 0 | Refills: 0 | Status: DISCONTINUED | OUTPATIENT
Start: 2017-04-25 | End: 2017-04-27

## 2017-04-25 RX ORDER — ALPRAZOLAM 0.25 MG
0.25 TABLET ORAL
Qty: 0 | Refills: 0 | Status: DISCONTINUED | OUTPATIENT
Start: 2017-04-25 | End: 2017-04-27

## 2017-04-25 RX ORDER — INSULIN GLARGINE 100 [IU]/ML
30 INJECTION, SOLUTION SUBCUTANEOUS AT BEDTIME
Qty: 0 | Refills: 0 | Status: DISCONTINUED | OUTPATIENT
Start: 2017-04-25 | End: 2017-04-27

## 2017-04-25 RX ORDER — ACETAMINOPHEN 500 MG
650 TABLET ORAL EVERY 6 HOURS
Qty: 0 | Refills: 0 | Status: DISCONTINUED | OUTPATIENT
Start: 2017-04-25 | End: 2017-04-27

## 2017-04-25 RX ADMIN — Medication 10 MILLIGRAM(S): at 18:57

## 2017-04-25 RX ADMIN — Medication: at 16:24

## 2017-04-25 RX ADMIN — Medication 10 MILLIGRAM(S): at 12:39

## 2017-04-25 RX ADMIN — FAMOTIDINE 20 MILLIGRAM(S): 10 INJECTION INTRAVENOUS at 18:58

## 2017-04-25 RX ADMIN — Medication 5 UNIT(S): at 10:19

## 2017-04-25 RX ADMIN — SENNA PLUS 2 TABLET(S): 8.6 TABLET ORAL at 23:08

## 2017-04-25 RX ADMIN — INSULIN GLARGINE 30 UNIT(S): 100 INJECTION, SOLUTION SUBCUTANEOUS at 23:09

## 2017-04-25 RX ADMIN — Medication 75 MILLIGRAM(S): at 18:57

## 2017-04-25 RX ADMIN — Medication 0.25 MILLIGRAM(S): at 18:57

## 2017-04-25 RX ADMIN — Medication 81 MILLIGRAM(S): at 12:39

## 2017-04-25 RX ADMIN — Medication 4: at 10:20

## 2017-04-25 RX ADMIN — Medication 6 MILLIGRAM(S): at 06:29

## 2017-04-25 RX ADMIN — Medication 75 MILLIGRAM(S): at 06:29

## 2017-04-25 RX ADMIN — Medication 5 UNIT(S): at 12:48

## 2017-04-25 RX ADMIN — FAMOTIDINE 20 MILLIGRAM(S): 10 INJECTION INTRAVENOUS at 06:29

## 2017-04-25 RX ADMIN — Medication 6 MILLIGRAM(S): at 12:42

## 2017-04-25 RX ADMIN — Medication 6 MILLIGRAM(S): at 23:09

## 2017-04-25 RX ADMIN — ATORVASTATIN CALCIUM 40 MILLIGRAM(S): 80 TABLET, FILM COATED ORAL at 23:08

## 2017-04-25 RX ADMIN — DULOXETINE HYDROCHLORIDE 30 MILLIGRAM(S): 30 CAPSULE, DELAYED RELEASE ORAL at 18:58

## 2017-04-25 RX ADMIN — Medication 6 MILLIGRAM(S): at 18:59

## 2017-04-25 RX ADMIN — Medication: at 19:02

## 2017-04-25 RX ADMIN — DULOXETINE HYDROCHLORIDE 30 MILLIGRAM(S): 30 CAPSULE, DELAYED RELEASE ORAL at 06:29

## 2017-04-25 RX ADMIN — LEVETIRACETAM 1500 MILLIGRAM(S): 250 TABLET, FILM COATED ORAL at 06:29

## 2017-04-25 RX ADMIN — Medication 1 MILLIGRAM(S): at 12:39

## 2017-04-25 RX ADMIN — Medication 5 UNIT(S): at 19:03

## 2017-04-25 RX ADMIN — LEVETIRACETAM 1500 MILLIGRAM(S): 250 TABLET, FILM COATED ORAL at 18:58

## 2017-04-25 NOTE — GOALS OF CARE CONVERSATION - PERSONAL ADVANCE DIRECTIVE - TREATMENT GUIDELINE COMMENT
DNR/DNI  Hospice referal made  Previous MOLST form voided. New MOLST completed with DNR/DNI.    Time spent 25 minutes

## 2017-04-25 NOTE — PROGRESS NOTE ADULT - SUBJECTIVE AND OBJECTIVE BOX
HOSPITALIST PROGRESS NOTE    NEMESIO OSBORNE  [id number]  62yMale    Patient is a 62y old  Male who presents with a chief complaint of Right arm went limp (23 Apr 2017 19:54)      SUBJECTIVE:   Chart reviewed since last visit.  Patient seen and examined at bedside ealier today for right sided paralysis, aphasia, GBM, DMT2, etc.  Unable to obtain proper ROS as patient states yes to most question or cannot answer      OBJECTIVE:  Vital Signs Last 24 Hrs  T(C): 36.4, Max: 37.1 (04-25 @ 03:17)  T(F): 97.5, Max: 98.7 (04-25 @ 03:17)  HR: 89 (66 - 99)  BP: 182/103 (142/81 - 182/103)  BP(mean): --  RR: 18 (17 - 22)  SpO2: 97% (94% - 97%)    PHYSICAL EXAMINATION  General: NAD[]   nontoxic[]   ill-appearing[]  Obese[]  HEENT: Scar left temporoparietal scalp.   NECK: Supple[+]  JVD[] Carotid bruit[]  CVS: RRR[+]  Irregular[]  S1+S2[+]   Murmur[]  RESP: Fair air entry bilaterally[+]   Clear sounds[]   poor effort []  wheeze[-]   Crackles[-]  GI: Soft[+]  Nondistended[+]   Nontender[+]   Bowel Sounds[+]  Mass[]   HSM[]   Ascites[]  : suprapubic tenderness[-]   CVA Tenderness[]   Pierre[]  MS: FROM[+]   Edema[+]  CNS: NIH - 17. Right extremities 0/5.   INTEG: Skin is Warm[+]  dry[] Lesion[] Decubitus[]  PSYCH: Fair mood    MONITOR:  CAPILLARY BLOOD GLUCOSE  418 (25 Apr 2017 16:15)  374 (25 Apr 2017 13:06)  329 (25 Apr 2017 09:42)      RADIOLOGY     EXAM:  BRAIN (MRI) W O CON                          PROCEDURE DATE:  04/25/2017        INTERPRETATION:      REASON FOR EXAM: 62-year-old man history of GBM new onset right   flaccidity.         TECHNIQUE:   Standardized multiplanar fat and water weighted pulse   sequences were obtained without administration of contrast.      COMPARISON:   None.    FINDINGS:  There is diffusion restriction consistent with acute infarct in the left   basal ganglia/corona radiata. There is mild regional mass effect and   regional petechial hemorrhage. There are postoperative changes related to   left pterional craniotomy. There is an area of vasogenic edema and mild   regional mass effect and the left frontal and temporal region extending   to the left uppermid brain related to underlying neoplastic process   and/or associated posttreatment changes. There is prominence of the   ventricles and cortical sulci. The midline structures are intact.    There is no tonsillar ectopia.    No extra-axial collections are identified.    There is poor visualization of the distal branches of left middle   cerebral artery.    The visualized paranasal sinuses are clear.    The visualized portions of the nasopharynx, mastoids and upper cervical   spine are grossly unremarkable.    IMPRESSION:    Acute infarct in the left corona radiata/basal ganglia areas of petechial   hemorrhage and mild regional mass effect superimposed on areas of   vasogenic edema in the left temporal frontoparietal region as described   above.                ARIEL FRANCISCO M.D., ATTENDING RADIOLOGIST  This document has been electronically signed. Apr 25 2017  1:42PM        MEDICATIONS  (STANDING):  aspirin enteric coated 81milliGRAM(s) Oral daily  insulin lispro (HumaLOG) corrective regimen sliding scale  SubCutaneous three times a day before meals  dextrose 5%. 1000milliLiter(s) IV Continuous <Continuous>  dextrose 50% Injectable 12.5Gram(s) IV Push once  dexamethasone  Injectable 6milliGRAM(s) IV Push every 6 hours  atorvastatin 40milliGRAM(s) Oral at bedtime  famotidine    Tablet 20milliGRAM(s) Oral two times a day  folic acid 1milliGRAM(s) Oral daily  pregabalin 75milliGRAM(s) Oral two times a day  DULoxetine 30milliGRAM(s) Oral two times a day  levETIRAcetam 1500milliGRAM(s) Oral two times a day  senna 2Tablet(s) Oral at bedtime  trimethoprim  160 mG/sulfamethoxazole 800 mG 1Tablet(s) Oral <User Schedule>  insulin lispro Injectable (HumaLOG) 5Unit(s) SubCutaneous three times a day before meals  insulin glargine Injectable (LANTUS) 15Unit(s) SubCutaneous at bedtime  ALPRAZolam 0.25milliGRAM(s) Oral two times a day      MEDICATIONS  (PRN):  dextrose Gel 1Dose(s) Oral once PRN Blood Glucose LESS THAN 70 milliGRAM(s)/deciliter  glucagon  Injectable 1milliGRAM(s) IntraMuscular once PRN Glucose LESS THAN 70 milligrams/deciliter  docusate sodium 100milliGRAM(s) Oral two times a day PRN Constipation  labetalol Injectable 10milliGRAM(s) IV Push every 4 hours PRN SBP>180, DBP>95  hydrALAZINE 10milliGRAM(s) Oral every 3 hours PRN Systolic/Diastolic >  oxyCODONE  5 mG/acetaminophen 325 mG 1Tablet(s) Oral every 6 hours PRN Severe Pain (7 - 10)  acetaminophen   Tablet. 650milliGRAM(s) Oral every 6 hours PRN Mild Pain (1 - 3)

## 2017-04-25 NOTE — PROGRESS NOTE ADULT - ASSESSMENT
62yr man with GBM s/p resection, radiation and chemotherapy and recently placed on Avastin. Patient with continued progression of disease, worsening mass affect, right hemiparesis, aphasic.  Poor prognosis.

## 2017-04-25 NOTE — PROGRESS NOTE ADULT - SUBJECTIVE AND OBJECTIVE BOX
INTERVAL HPI/OVERNIGHT EVENTS:  no overnight events.   tolerating diet  Brother states patient  is anxious and  cannot always make needs known and thus cannot request the Xanax. Feels Xanax helps him    PRESENT SYMPTOMS: SOURCE:  Patient/Family/Team    PAIN SCALE:  0 = none  1 = mild   2 = moderate  3 = severe    Pain: 1      Dyspnea:  [ ] YES [ x] NO  Anxiety:  [ x] YES [ ] NO  Fatigue: [ x] YES [ ] NO  Nausea: [ ] YES [x ] NO  Loss of Appetite: [ ] YES [x ] NO  Other symptoms: __________    MEDICATIONS  (STANDING):  aspirin enteric coated 81milliGRAM(s) Oral daily  insulin lispro (HumaLOG) corrective regimen sliding scale  SubCutaneous three times a day before meals  dextrose 5%. 1000milliLiter(s) IV Continuous <Continuous>  dextrose 50% Injectable 12.5Gram(s) IV Push once  dexamethasone  Injectable 6milliGRAM(s) IV Push every 6 hours  atorvastatin 40milliGRAM(s) Oral at bedtime  famotidine    Tablet 20milliGRAM(s) Oral two times a day  folic acid 1milliGRAM(s) Oral daily  pregabalin 75milliGRAM(s) Oral two times a day  DULoxetine 30milliGRAM(s) Oral two times a day  levETIRAcetam 1500milliGRAM(s) Oral two times a day  senna 2Tablet(s) Oral at bedtime  trimethoprim  160 mG/sulfamethoxazole 800 mG 1Tablet(s) Oral <User Schedule>  insulin lispro Injectable (HumaLOG) 5Unit(s) SubCutaneous three times a day before meals  insulin glargine Injectable (LANTUS) 15Unit(s) SubCutaneous at bedtime    MEDICATIONS  (PRN):  dextrose Gel 1Dose(s) Oral once PRN Blood Glucose LESS THAN 70 milliGRAM(s)/deciliter  glucagon  Injectable 1milliGRAM(s) IntraMuscular once PRN Glucose LESS THAN 70 milligrams/deciliter  docusate sodium 100milliGRAM(s) Oral two times a day PRN Constipation  labetalol Injectable 10milliGRAM(s) IV Push every 4 hours PRN SBP>180, DBP>95  ALPRAZolam 0.25milliGRAM(s) Oral three times a day PRN anxiety  hydrALAZINE 10milliGRAM(s) Oral every 3 hours PRN Systolic/Diastolic >      Allergies    No Known Allergies    Intolerances      Karnofsky Performance Score/Palliative Performance Status Version 2: 30 %    Vital Signs Last 24 Hrs  T(C): 36.4, Max: 37.1 (04-25 @ 03:17)  T(F): 97.5, Max: 98.7 (04-25 @ 03:17)  HR: 90 (66 - 99)  BP: 145/96 (142/81 - 191/94)  BP(mean): --  RR: 20 (17 - 20)  SpO2: 94% (94% - 98%)    PHYSICAL EXAM:    General: [ x] alert- aphasic  [ ] oriented x ____ [ ] lethargic [ ] agitated                  [ ] cachexia  [ ] nonverbal  [ ] coma    HEENT: left eye patch, right eye non icteric    Lungs: [ x] comfortable [ ] tachypnea/labored breathing  [ ] excessive secretions    CV: [ x] normal  [ ] tachycardia    GI: [x ] normal  [ ] distended  [ ] tender  [ ] no BS               [ ] PEG/NG/OG tube    : [ x] normal  [ ] incontinent  [ ] oliguria/anuria  [ ] dia    MSK: [ ] normal  [ x] right sided weakness  [ ] edema             [ ] ambulatory  [ ] bedbound/wheelchair bound    Skin: [ ] normal  [ ] pressure ulcers- Stage_____  [x ] no rash    LABS:                        13.9   8.4   )-----------( 102      ( 23 Apr 2017 15:01 )             41.8     04-24    140  |  100  |  26.0<H>  ----------------------------<  283<H>  4.1   |  25.0  |  0.76    Ca    9.2      24 Apr 2017 06:26    TPro  6.5<L>  /  Alb  3.8  /  TBili  0.2<L>  /  DBili  x   /  AST  15  /  ALT  38  /  AlkPhos  82  04-23    PT/INR - ( 23 Apr 2017 15:01 )   PT: 10.3 sec;   INR: 0.94 ratio         PTT - ( 23 Apr 2017 15:01 )  PTT:33.9 sec    I&O's Summary      ADVANCE DIRECTIVES:  x[ ] YES [ ] NO    DNR: [ x] YES [ ] NO      Date Completed:     4/25/17               MOLST [x ] YES [ ] NO   Date Completed: 4/25/17    Thank you for the opportunity to assist with the care of this patient.   [facility] [phone].

## 2017-04-25 NOTE — CONSULT NOTE ADULT - ATTENDING COMMENTS
Agree with above.  As per Dr Mcnally, discussed case with family and patient - progressive disease despite treatments and poor prognosis.  Family and pt agreed with hospice care.   Recommend RUE support with pillow, lap tray, sling due to flaccidity/edema to prevent pain and subluxation of shoulder and right Z-flex boot to prevent skin breakdown due to flaccidity and decreased RLE sensation.  Will follow as needed
I personally interviewed and examined the patient, independent of the above practitioner (THERESA Flores). I agree with the above, and have updated the appropriate areas of the chart as noted. I spent _40__ minutes with the patient separate from the above practitioner for my evaluation and interview.     Mr. New unfortunately has great difficulty providing me with detailed information as he has expressive aphasia to an extent. he can voice mostly "yes/no" and what seems appropriate, however has severe dysarthria with other more comprehensive questions/answer. He denies any generalized pain or discomfort, and does not seem to have any other concerns/complaints at this time. His exam is at noted with right hemiplegia and his speech difficulties as described.     I spoke with THERESA Flores last night and agree with the decision and impression. At this time he does not require MICU level interventions or therapy. Even if he were to have hourly neurologic checks, his eminent decompensation could not physiologically be reversed as it would either be acute closer of his MCA, acute rupture of his MCA, or acute herniation of his brain, as there is no neurosurgical interventions that are being offered (per ED documentation). Invasive interventions such as intubation and cardiopulmonary resuscitation are not physiologically appropriate as they cannot correct the cause of his decompensation (if from intracranial catastrophe). Medically there are no different interventions the MICU can provide, other than the already recommended steroids, home medications, possibly anti-seizure medications, pain and nausea control. I agree with palliative and hospice evaluations. Regarding his MOLST. I have concerns that he has lost his communicative ability, which may impede his capacity, therefore we should involve the family (not present at this time) with these discussions. ANOTHER recommendation would be to talk with his primary neurologist and neurosurgeon to see if a transfer of facilities would provide any other options.

## 2017-04-25 NOTE — PROGRESS NOTE ADULT - SUBJECTIVE AND OBJECTIVE BOX
INTERVAL HISTORY:  no changes      VITAL SIGNS:  Vital Signs Last 24 Hrs  T(C): 37.1, Max: 37.1 (04-25 @ 03:17)  T(F): 98.7, Max: 98.7 (04-25 @ 03:17)  HR: 66 (66 - 102)  BP: 156/87 (146/91 - 215/139)  BP(mean): --  RR: 18 (16 - 20)  SpO2: 95% (94% - 99%)    PHYSICAL EXAMINATION:    Mentation:  awake and cooperative  Language/Speech: global aphasia mostly expressive, follows some verbal commands  CN: left CNIII palsy, ptosis, wearing eye patch  Visual Fields: full -left eye to threat  Motor: right hemioplegia  Sensory:  DTR:  Babinski:      MEDS:  MEDICATIONS  (STANDING):  aspirin enteric coated 81milliGRAM(s) Oral daily  insulin lispro (HumaLOG) corrective regimen sliding scale  SubCutaneous three times a day before meals  dextrose 5%. 1000milliLiter(s) IV Continuous <Continuous>  dextrose 50% Injectable 12.5Gram(s) IV Push once  dexamethasone  Injectable 6milliGRAM(s) IV Push every 6 hours  atorvastatin 40milliGRAM(s) Oral at bedtime  famotidine    Tablet 20milliGRAM(s) Oral two times a day  folic acid 1milliGRAM(s) Oral daily  pregabalin 75milliGRAM(s) Oral two times a day  DULoxetine 30milliGRAM(s) Oral two times a day  levETIRAcetam 1500milliGRAM(s) Oral two times a day  senna 2Tablet(s) Oral at bedtime  trimethoprim  160 mG/sulfamethoxazole 800 mG 1Tablet(s) Oral <User Schedule>  insulin lispro Injectable (HumaLOG) 5Unit(s) SubCutaneous three times a day before meals  insulin glargine Injectable (LANTUS) 15Unit(s) SubCutaneous at bedtime    MEDICATIONS  (PRN):  dextrose Gel 1Dose(s) Oral once PRN Blood Glucose LESS THAN 70 milliGRAM(s)/deciliter  glucagon  Injectable 1milliGRAM(s) IntraMuscular once PRN Glucose LESS THAN 70 milligrams/deciliter  docusate sodium 100milliGRAM(s) Oral two times a day PRN Constipation  labetalol Injectable 10milliGRAM(s) IV Push every 4 hours PRN SBP>180, DBP>95  ALPRAZolam 0.25milliGRAM(s) Oral three times a day PRN anxiety  hydrALAZINE 10milliGRAM(s) Oral every 3 hours PRN Systolic/Diastolic >      LABS:                          13.9   8.4   )-----------( 102      ( 23 Apr 2017 15:01 )             41.8     04-24    140  |  100  |  26.0<H>  ----------------------------<  283<H>  4.1   |  25.0  |  0.76    Ca    9.2      24 Apr 2017 06:26    TPro  6.5<L>  /  Alb  3.8  /  TBili  0.2<L>  /  DBili  x   /  AST  15  /  ALT  38  /  AlkPhos  82  04-23    LIVER FUNCTIONS - ( 23 Apr 2017 15:01 )  Alb: 3.8 g/dL / Pro: 6.5 g/dL / ALK PHOS: 82 U/L / ALT: 38 U/L / AST: 15 U/L / GGT: x               RADIOLOGY & ADDITIONAL STUDIES:      IMPRESSION & PLAN:    Patient is neurologically unchanged. exam as described  MR studies ordered by hospitalist  Nothing further to offer. Will not actively follow. Please recontact as needed INTERVAL HISTORY:  no changes      VITAL SIGNS:  Vital Signs Last 24 Hrs  T(C): 37.1, Max: 37.1 (04-25 @ 03:17)  T(F): 98.7, Max: 98.7 (04-25 @ 03:17)  HR: 66 (66 - 102)  BP: 156/87 (146/91 - 215/139)  BP(mean): --  RR: 18 (16 - 20)  SpO2: 95% (94% - 99%)    PHYSICAL EXAMINATION:    Mentation:  awake and cooperative  Language/Speech: global aphasia mostly expressive, follows some verbal commands  CN: left CNIII palsy, ptosis, wearing eye patch  Visual Fields: full -left eye to threat  Motor: right hemioplegia  Sensory:  DTR:  Babinski:      MEDS:  MEDICATIONS  (STANDING):  aspirin enteric coated 81milliGRAM(s) Oral daily  insulin lispro (HumaLOG) corrective regimen sliding scale  SubCutaneous three times a day before meals  dextrose 5%. 1000milliLiter(s) IV Continuous <Continuous>  dextrose 50% Injectable 12.5Gram(s) IV Push once  dexamethasone  Injectable 6milliGRAM(s) IV Push every 6 hours  atorvastatin 40milliGRAM(s) Oral at bedtime  famotidine    Tablet 20milliGRAM(s) Oral two times a day  folic acid 1milliGRAM(s) Oral daily  pregabalin 75milliGRAM(s) Oral two times a day  DULoxetine 30milliGRAM(s) Oral two times a day  levETIRAcetam 1500milliGRAM(s) Oral two times a day  senna 2Tablet(s) Oral at bedtime  trimethoprim  160 mG/sulfamethoxazole 800 mG 1Tablet(s) Oral <User Schedule>  insulin lispro Injectable (HumaLOG) 5Unit(s) SubCutaneous three times a day before meals  insulin glargine Injectable (LANTUS) 15Unit(s) SubCutaneous at bedtime    MEDICATIONS  (PRN):  dextrose Gel 1Dose(s) Oral once PRN Blood Glucose LESS THAN 70 milliGRAM(s)/deciliter  glucagon  Injectable 1milliGRAM(s) IntraMuscular once PRN Glucose LESS THAN 70 milligrams/deciliter  docusate sodium 100milliGRAM(s) Oral two times a day PRN Constipation  labetalol Injectable 10milliGRAM(s) IV Push every 4 hours PRN SBP>180, DBP>95  ALPRAZolam 0.25milliGRAM(s) Oral three times a day PRN anxiety  hydrALAZINE 10milliGRAM(s) Oral every 3 hours PRN Systolic/Diastolic >      LABS:                          13.9   8.4   )-----------( 102      ( 23 Apr 2017 15:01 )             41.8     04-24    140  |  100  |  26.0<H>  ----------------------------<  283<H>  4.1   |  25.0  |  0.76    Ca    9.2      24 Apr 2017 06:26    TPro  6.5<L>  /  Alb  3.8  /  TBili  0.2<L>  /  DBili  x   /  AST  15  /  ALT  38  /  AlkPhos  82  04-23    LIVER FUNCTIONS - ( 23 Apr 2017 15:01 )  Alb: 3.8 g/dL / Pro: 6.5 g/dL / ALK PHOS: 82 U/L / ALT: 38 U/L / AST: 15 U/L / GGT: x               RADIOLOGY & ADDITIONAL STUDIES:      IMPRESSION & PLAN:    GBM:  Patient is neurologically unchanged. exam as described  MR studies ordered by hospitalist  Nothing further to offer. Will not actively follow. Please recontact as needed

## 2017-04-25 NOTE — PROGRESS NOTE ADULT - ASSESSMENT
62 year old male with known GBM now presenting with new right extremity hemiplegia secondary to tumour and edema vs stroke. He does have uncal herniation which is chronic though worse from before, though no midline shift is noted on CTH. He does have moderately severe asphasia which is present from before. No change in patient status since admission.   DMT2 with hyperglycemia exacerbated by steroids. Uncontrolled  HTN, improved.    Poor prognosis

## 2017-04-25 NOTE — CONSULT NOTE ADULT - SUBJECTIVE AND OBJECTIVE BOX
Patient is a 62y old  Male who presents for rehab evaluation with a chief complaint of GBM      HPI:  62 year old male with PMHx significant for left sided GBM s/p surgery, XRT and chemotherapy recently started on Avastin admitted 4/23 after  found by family to be unable to move right arm in Rehab today. Patient was in [facility] in [place] 2 weeks prior to this for left arm flailing as family were told that there was 'swelling of tumor and artery being blocked'. Patient has mild to moderate degree of aphasia at baseline, and is not the best historian. He says 'yes' to almost everything , so unable to really determine his complaints.  He's had weakness of the right side before, but has never been unable to move. CT head showed mild increase in left uncal herniation which may combination of tumor and edema. Patient deemed not to be neurosurgical candidate. Hospice/Palliative care discussions had between primary team and family but they would like full measures at this time. MRI/MRA pending.      REVIEW OF SYSTEMS  Constitutional - No fever, No weight loss, No fatigue  HEENT - No eye pain, + visual disturbances, No difficulty hearing, No tinnitus, No vertigo, No neck pain  Respiratory - No cough, No wheezing, No shortness of breath  Cardiovascular - No chest pain, No palpitations  Gastrointestinal - No abdominal pain, No nausea, No vomiting, No diarrhea, No constipation  Genitourinary - No dysuria, No frequency, No hematuria, No incontinence  Neurological - No headaches, No memory loss,+ loss of strength, No numbness, No tremors  Skin - No itching, No rashes, No lesions   Endocrine - No temperature intolerance  Musculoskeletal - No joint pain, No joint swelling, No muscle pain  Psychiatric - + depression, No anxiety    PAST MEDICAL & SURGICAL HISTORY  Glioblastoma  Essential hypertension  Diabetes mellitus of other type with hyperosmolarity, with long-term current use of insulin  Brain tumor  Constipation  GERD (gastroesophageal reflux disease)  Astrocytoma brain tumor  Depression  Seizure  Brain tumor  Cognitive impairment  Brain tumor, astrocytoma  Glioblastoma multiforme of brain  IDDM (insulin dependent diabetes mellitus)  HLD (hyperlipidemia)  Brain tumor  H/O cervical spine surgery  H/O total hip arthroplasty, left  Brain tumor      SOCIAL HISTORY  Smoking - Denied  EtOH - Denied   Drugs - Denied    FUNCTIONAL HISTORY  Lives   Independent    CURRENT FUNCTIONAL STATUS PT Eval pending      FAMILY HISTORY   Family history of prostate cancer in father      RECENT LABS/IMAGING  CBC Full  -  ( 23 Apr 2017 15:01 )  WBC Count : 8.4 K/uL  Hemoglobin : 13.9 g/dL  Hematocrit : 41.8 %  Platelet Count - Automated : 102 K/uL  Mean Cell Volume : 91.5 fl  Mean Cell Hemoglobin : 30.4 pg  Mean Cell Hemoglobin Concentration : 33.3 g/dL    04-24    140  |  100  |  26.0<H>  ----------------------------<  283<H>  4.1   |  25.0  |  0.76    Ca    9.2      24 Apr 2017 06:26    TPro  6.5<L>  /  Alb  3.8  /  TBili  0.2<L>  /  DBili  x   /  AST  15  /  ALT  38  /  AlkPhos  82  04-23        VITALS  T(C): 37.1, Max: 37.1 (04-25 @ 03:17)  HR: 66 (66 - 102)  BP: 156/87 (146/91 - 215/139)  RR: 18 (16 - 20)  SpO2: 95% (94% - 99%)      ALLERGIES  No Known Allergies      MEDICATIONS   aspirin enteric coated 81milliGRAM(s) Oral daily  insulin lispro (HumaLOG) corrective regimen sliding scale  SubCutaneous three times a day before meals  dextrose 5%. 1000milliLiter(s) IV Continuous <Continuous>  dextrose Gel 1Dose(s) Oral once PRN  dextrose 50% Injectable 12.5Gram(s) IV Push once  glucagon  Injectable 1milliGRAM(s) IntraMuscular once PRN  dexamethasone  Injectable 6milliGRAM(s) IV Push every 6 hours  atorvastatin 40milliGRAM(s) Oral at bedtime  famotidine    Tablet 20milliGRAM(s) Oral two times a day  docusate sodium 100milliGRAM(s) Oral two times a day PRN  folic acid 1milliGRAM(s) Oral daily  labetalol Injectable 10milliGRAM(s) IV Push every 4 hours PRN  pregabalin 75milliGRAM(s) Oral two times a day  DULoxetine 30milliGRAM(s) Oral two times a day  levETIRAcetam 1500milliGRAM(s) Oral two times a day  senna 2Tablet(s) Oral at bedtime  trimethoprim  160 mG/sulfamethoxazole 800 mG 1Tablet(s) Oral <User Schedule>  ALPRAZolam 0.25milliGRAM(s) Oral three times a day PRN  insulin lispro Injectable (HumaLOG) 5Unit(s) SubCutaneous three times a day before meals  insulin glargine Injectable (LANTUS) 15Unit(s) SubCutaneous at bedtime  hydrALAZINE 10milliGRAM(s) Oral every 3 hours PRN Patient is a 62y old  Male who presents for rehab evaluation with a chief complaint of right sided weakness      HPI:  62 year old male with PMHx significant for left sided GBM s/p surgery, XRT and chemotherapy recently started on Avastin admitted 4/23 after  found by family to be unable to move right arm on day of admission. Patient was in [facility] in [place] 2 weeks prior to this for left arm flailing as family were told that there was 'swelling of tumor and artery being blocked'. Patient has mild to moderate degree of aphasia at baseline, and is not the best historian. He says 'yes' to almost everything , so unable to really determine his complaints.  He's had weakness of the right side before, but has never been unable to move. CT head showed mild increase in left uncal herniation which may combination of tumor and edema. Patient deemed not to be neurosurgical candidate. Hospice/Palliative care discussions had between primary team and family but they would like full measures at this time. MRA negative. MRI results pending      REVIEW OF SYSTEMS  Constitutional - No fever, No weight loss, No fatigue  HEENT - No eye pain, + visual disturbances, No difficulty hearing, No tinnitus, No vertigo, No neck pain  Respiratory - No cough, No wheezing, No shortness of breath  Cardiovascular - No chest pain, No palpitations  Gastrointestinal - No abdominal pain, No nausea, No vomiting, No diarrhea, No constipation  Genitourinary - No dysuria, No frequency, No hematuria, No incontinence  Neurological - No headaches, No memory loss,+ loss of strength, + numbness, No tremors  Skin - No itching, No rashes, No lesions   Endocrine - No temperature intolerance  Musculoskeletal - No joint pain, No joint swelling, No muscle pain  Psychiatric - + depression, No anxiety    PAST MEDICAL & SURGICAL HISTORY  Glioblastoma  Essential hypertension  Diabetes mellitus of other type with hyperosmolarity, with long-term current use of insulin  Brain tumor  Constipation  GERD (gastroesophageal reflux disease)  Astrocytoma brain tumor  Depression  Seizure  Brain tumor  Cognitive impairment  Brain tumor, astrocytoma  Glioblastoma multiforme of brain  IDDM (insulin dependent diabetes mellitus)  HLD (hyperlipidemia)  Brain tumor  H/O cervical spine surgery  H/O total hip arthroplasty, left  Brain tumor      SOCIAL HISTORY  Smoking - Denied  EtOH - Denied   Drugs - Denied    FUNCTIONAL HISTORY  Lives with family  Previous level of function unknown. No family at bedside    CURRENT FUNCTIONAL STATUS PT Eval pending      FAMILY HISTORY   Family history of prostate cancer in father      RECENT LABS/IMAGING  CBC Full  -  ( 23 Apr 2017 15:01 )  WBC Count : 8.4 K/uL  Hemoglobin : 13.9 g/dL  Hematocrit : 41.8 %  Platelet Count - Automated : 102 K/uL  Mean Cell Volume : 91.5 fl  Mean Cell Hemoglobin : 30.4 pg  Mean Cell Hemoglobin Concentration : 33.3 g/dL    04-24    140  |  100  |  26.0<H>  ----------------------------<  283<H>  4.1   |  25.0  |  0.76    Ca    9.2      24 Apr 2017 06:26    TPro  6.5<L>  /  Alb  3.8  /  TBili  0.2<L>  /  DBili  x   /  AST  15  /  ALT  38  /  AlkPhos  82  04-23        VITALS  T(C): 37.1, Max: 37.1 (04-25 @ 03:17)  HR: 66 (66 - 102)  BP: 156/87 (146/91 - 215/139)  RR: 18 (16 - 20)  SpO2: 95% (94% - 99%)      ALLERGIES  No Known Allergies      MEDICATIONS   aspirin enteric coated 81milliGRAM(s) Oral daily  insulin lispro (HumaLOG) corrective regimen sliding scale  SubCutaneous three times a day before meals  dextrose 5%. 1000milliLiter(s) IV Continuous <Continuous>  dextrose Gel 1Dose(s) Oral once PRN  dextrose 50% Injectable 12.5Gram(s) IV Push once  glucagon  Injectable 1milliGRAM(s) IntraMuscular once PRN  dexamethasone  Injectable 6milliGRAM(s) IV Push every 6 hours  atorvastatin 40milliGRAM(s) Oral at bedtime  famotidine    Tablet 20milliGRAM(s) Oral two times a day  docusate sodium 100milliGRAM(s) Oral two times a day PRN  folic acid 1milliGRAM(s) Oral daily  labetalol Injectable 10milliGRAM(s) IV Push every 4 hours PRN  pregabalin 75milliGRAM(s) Oral two times a day  DULoxetine 30milliGRAM(s) Oral two times a day  levETIRAcetam 1500milliGRAM(s) Oral two times a day  senna 2Tablet(s) Oral at bedtime  trimethoprim  160 mG/sulfamethoxazole 800 mG 1Tablet(s) Oral <User Schedule>  ALPRAZolam 0.25milliGRAM(s) Oral three times a day PRN  insulin lispro Injectable (HumaLOG) 5Unit(s) SubCutaneous three times a day before meals  insulin glargine Injectable (LANTUS) 15Unit(s) SubCutaneous at bedtime  hydrALAZINE 10milliGRAM(s) Oral every 3 hours PRN    ----------------------------------------------------------------------------------------  PHYSICAL EXAM  Constitutional - NAD, Comfortable  HEENT - NCAT  Neck - Supple, No limited ROM  Chest - CTA bilaterally, No wheeze, No rhonchi, No crackles  Cardiovascular - RRR, S1S2, No murmurs  Abdomen - BS+, Soft, NTND  Extremities - No C/C, mild edema right hand, No calf tenderness   Neurologic Exam -                    Cognitive - Awake, Alert, AAO to self, hospital, month with cues, and President when given options     Communication - Aphasic with expressive > receptive deficits. Follows basic commands.     Cranial Nerves - Left CN III palsy otherwise CN 2-12 intact     Motor - Right hemiplegia, normal strength and ROM of LUE and LLE     Sensory - impaired sensation to light touch RUE and RLE otherwise intact  Psychiatric - Mood pleasant, Affect WNL  ----------------------------------------------------------------------------------------  ASSESSMENT/PLAN    61 yo M with GBM, right hemiplegia and progressively worsening deficits.    PT Eval Pending  MRI results pending  Precautions- Fall, Vision    - Family exploring options for long term goals of care including Palliative Medicine and Hospice. PT eval is pending. We will continue to follow throughout medical course for further rehab recommendations. Patient is a 62y old  Male who presents for rehab evaluation with a chief complaint of right sided weakness      HPI:  62 year old LHD male with PMHx significant for left sided GBM s/p surgery, XRT and chemotherapy recently started on Avastin admitted 4/23 after  found by family to be unable to move right arm on day of admission. Patient was in [facility] in [place] 2 weeks prior to this for left arm flailing as family were told that there was 'swelling of tumor and artery being blocked'. Patient has mild to moderate degree of aphasia at baseline, and is not the best historian. He says 'yes' to almost everything , so unable to really determine his complaints.  He's had weakness of the right side before, but has never been unable to move. CT head showed mild increase in left uncal herniation which may combination of tumor and edema. Patient deemed not to be neurosurgical candidate. Hospice/Palliative care discussions had between primary team and family but they would like full measures at this time. MRA negative. MRI results pending      REVIEW OF SYSTEMS  Constitutional - No fever, No weight loss, No fatigue  HEENT - No eye pain, + visual disturbances, No difficulty hearing, No tinnitus, No vertigo, No neck pain  Respiratory - No cough, No wheezing, No shortness of breath  Cardiovascular - No chest pain, No palpitations  Gastrointestinal - No abdominal pain, No nausea, No vomiting, No diarrhea, No constipation  Genitourinary - No dysuria, No frequency, No hematuria, No incontinence  Neurological - No headaches, No memory loss,+ loss of strength, + numbness, No tremors  Skin - No itching, No rashes, No lesions   Endocrine - No temperature intolerance  Musculoskeletal - No joint pain, No joint swelling, No muscle pain  Psychiatric - + depression, No anxiety    PAST MEDICAL & SURGICAL HISTORY  Glioblastoma  Essential hypertension  Diabetes mellitus of other type with hyperosmolarity, with long-term current use of insulin  Brain tumor  Constipation  GERD (gastroesophageal reflux disease)  Astrocytoma brain tumor  Depression  Seizure  Brain tumor  Cognitive impairment  Brain tumor, astrocytoma  Glioblastoma multiforme of brain  IDDM (insulin dependent diabetes mellitus)  HLD (hyperlipidemia)  Brain tumor  H/O cervical spine surgery  H/O total hip arthroplasty, left  Brain tumor      SOCIAL HISTORY  Smoking - Denied  EtOH - Denied   Drugs - Denied    FUNCTIONAL HISTORY  Lives with family  Previous level of function unknown. No family at bedside    CURRENT FUNCTIONAL STATUS PT Eval pending      FAMILY HISTORY   Family history of prostate cancer in father      RECENT LABS/IMAGING  CBC Full  -  ( 23 Apr 2017 15:01 )  WBC Count : 8.4 K/uL  Hemoglobin : 13.9 g/dL  Hematocrit : 41.8 %  Platelet Count - Automated : 102 K/uL  Mean Cell Volume : 91.5 fl  Mean Cell Hemoglobin : 30.4 pg  Mean Cell Hemoglobin Concentration : 33.3 g/dL    04-24    140  |  100  |  26.0<H>  ----------------------------<  283<H>  4.1   |  25.0  |  0.76    Ca    9.2      24 Apr 2017 06:26    TPro  6.5<L>  /  Alb  3.8  /  TBili  0.2<L>  /  DBili  x   /  AST  15  /  ALT  38  /  AlkPhos  82  04-23        VITALS  T(C): 37.1, Max: 37.1 (04-25 @ 03:17)  HR: 66 (66 - 102)  BP: 156/87 (146/91 - 215/139)  RR: 18 (16 - 20)  SpO2: 95% (94% - 99%)      ALLERGIES  No Known Allergies      MEDICATIONS   aspirin enteric coated 81milliGRAM(s) Oral daily  insulin lispro (HumaLOG) corrective regimen sliding scale  SubCutaneous three times a day before meals  dextrose 5%. 1000milliLiter(s) IV Continuous <Continuous>  dextrose Gel 1Dose(s) Oral once PRN  dextrose 50% Injectable 12.5Gram(s) IV Push once  glucagon  Injectable 1milliGRAM(s) IntraMuscular once PRN  dexamethasone  Injectable 6milliGRAM(s) IV Push every 6 hours  atorvastatin 40milliGRAM(s) Oral at bedtime  famotidine    Tablet 20milliGRAM(s) Oral two times a day  docusate sodium 100milliGRAM(s) Oral two times a day PRN  folic acid 1milliGRAM(s) Oral daily  labetalol Injectable 10milliGRAM(s) IV Push every 4 hours PRN  pregabalin 75milliGRAM(s) Oral two times a day  DULoxetine 30milliGRAM(s) Oral two times a day  levETIRAcetam 1500milliGRAM(s) Oral two times a day  senna 2Tablet(s) Oral at bedtime  trimethoprim  160 mG/sulfamethoxazole 800 mG 1Tablet(s) Oral <User Schedule>  ALPRAZolam 0.25milliGRAM(s) Oral three times a day PRN  insulin lispro Injectable (HumaLOG) 5Unit(s) SubCutaneous three times a day before meals  insulin glargine Injectable (LANTUS) 15Unit(s) SubCutaneous at bedtime  hydrALAZINE 10milliGRAM(s) Oral every 3 hours PRN    ----------------------------------------------------------------------------------------  PHYSICAL EXAM  Constitutional - NAD, Comfortable  HEENT - NCAT  Left scalp incision CDI.  Left eye ptosis with patch  Neck - Supple, No limited ROM  Chest - CTA bilaterally, No wheeze, No rhonchi, No crackles  Cardiovascular - RRR, S1S2, No murmurs  Abdomen - BS+, Soft, NTND  Extremities - No C/C, mild edema right hand, No calf tenderness   Neurologic Exam -                    Cognitive - Awake, Alert, AAO to self, hospital, month with cues, and President when given options     Communication - Aphasic with expressive > receptive deficits. Follows basic commands.     Cranial Nerves - Left CN III palsy otherwise CN 2-12 intact     Motor - Right hemiplegia/flaccid, normal strength and ROM of LUE and LLE     Sensory - impaired sensation to light touch RUE and RLE otherwise intact  Psychiatric - Mood pleasant, Affect WNL  ----------------------------------------------------------------------------------------  ASSESSMENT/PLAN    63 yo M with GBM, right hemiplegia and progressively worsening deficits.    PT/OT Eval Pending  MRI results pending  Precautions- Fall, Vision    - Family exploring options for long term goals of care including Palliative Medicine and Hospice. PT eval is pending. We will continue to follow throughout medical course for further rehab recommendations.

## 2017-04-25 NOTE — GOALS OF CARE CONVERSATION - PERSONAL ADVANCE DIRECTIVE - CONVERSATION DETAILS
Discussed current condition - progressive disease despite treatments and poor prognosis. Discussed focusing on comfort as treatments are not working. Hospice offered. Patient agreed. Family wishes [facility].  Discussed Advance directives/CPR unlikely to be beneficial in the setting of his extensive disease. He stated "just let me go" in reference if to a CPA. He consented to DNR/DNI

## 2017-04-25 NOTE — PROGRESS NOTE ADULT - PROBLEM SELECTOR PLAN 4
See Goals of care note.  Patient able to participate in discussion.  In summary, discussed condition and poor prognosis. He consents to hospice services and DNR/DNI. NICOLLE hazel.  Spoke to case management- [facility] has already been called.   ( Family requests [facility]).  Psychosocial support.

## 2017-04-26 DIAGNOSIS — C71.9 MALIGNANT NEOPLASM OF BRAIN, UNSPECIFIED: ICD-10-CM

## 2017-04-26 PROCEDURE — 99233 SBSQ HOSP IP/OBS HIGH 50: CPT

## 2017-04-26 RX ADMIN — FAMOTIDINE 20 MILLIGRAM(S): 10 INJECTION INTRAVENOUS at 17:51

## 2017-04-26 RX ADMIN — Medication 75 MILLIGRAM(S): at 17:56

## 2017-04-26 RX ADMIN — Medication 0.25 MILLIGRAM(S): at 05:49

## 2017-04-26 RX ADMIN — Medication 0.25 MILLIGRAM(S): at 17:52

## 2017-04-26 RX ADMIN — LEVETIRACETAM 1500 MILLIGRAM(S): 250 TABLET, FILM COATED ORAL at 05:50

## 2017-04-26 RX ADMIN — FAMOTIDINE 20 MILLIGRAM(S): 10 INJECTION INTRAVENOUS at 05:50

## 2017-04-26 RX ADMIN — DULOXETINE HYDROCHLORIDE 30 MILLIGRAM(S): 30 CAPSULE, DELAYED RELEASE ORAL at 05:49

## 2017-04-26 RX ADMIN — Medication 10 UNIT(S): at 07:13

## 2017-04-26 RX ADMIN — Medication 6 MILLIGRAM(S): at 12:26

## 2017-04-26 RX ADMIN — Medication 3: at 07:13

## 2017-04-26 RX ADMIN — LEVETIRACETAM 1500 MILLIGRAM(S): 250 TABLET, FILM COATED ORAL at 17:48

## 2017-04-26 RX ADMIN — Medication 10 UNIT(S): at 12:18

## 2017-04-26 RX ADMIN — LOSARTAN POTASSIUM 25 MILLIGRAM(S): 100 TABLET, FILM COATED ORAL at 05:50

## 2017-04-26 RX ADMIN — Medication 81 MILLIGRAM(S): at 12:26

## 2017-04-26 RX ADMIN — Medication 3: at 12:17

## 2017-04-26 RX ADMIN — Medication 6 MILLIGRAM(S): at 17:50

## 2017-04-26 RX ADMIN — Medication 1 MILLIGRAM(S): at 17:48

## 2017-04-26 RX ADMIN — Medication 1 TABLET(S): at 17:50

## 2017-04-26 RX ADMIN — Medication 6 MILLIGRAM(S): at 22:31

## 2017-04-26 RX ADMIN — DULOXETINE HYDROCHLORIDE 30 MILLIGRAM(S): 30 CAPSULE, DELAYED RELEASE ORAL at 17:50

## 2017-04-26 RX ADMIN — Medication 2: at 17:47

## 2017-04-26 RX ADMIN — SENNA PLUS 2 TABLET(S): 8.6 TABLET ORAL at 22:29

## 2017-04-26 RX ADMIN — Medication 6 MILLIGRAM(S): at 05:49

## 2017-04-26 RX ADMIN — Medication 10 UNIT(S): at 17:47

## 2017-04-26 RX ADMIN — Medication 75 MILLIGRAM(S): at 05:49

## 2017-04-26 RX ADMIN — ATORVASTATIN CALCIUM 40 MILLIGRAM(S): 80 TABLET, FILM COATED ORAL at 22:29

## 2017-04-26 RX ADMIN — INSULIN GLARGINE 30 UNIT(S): 100 INJECTION, SOLUTION SUBCUTANEOUS at 22:29

## 2017-04-26 NOTE — PROGRESS NOTE ADULT - SUBJECTIVE AND OBJECTIVE BOX
INTERVAL HPI/OVERNIGHT EVENTS:  no overnight events    PRESENT SYMPTOMS: SOURCE:  Patient/Family/Team    PAIN SCALE:  0 = none  1 = mild   2 = moderate  3 = severe    Pain: 2-3      Dyspnea:  [ ] YES [x ] NO  Anxiety:  [ x] YES [ ] NO  Fatigue: [ x] YES [ ] NO  Nausea: [ ] YES [x ] NO  Loss of Appetite: [ x] YES [ ] NO  Other symptoms: __________    MEDICATIONS  (STANDING):  aspirin enteric coated 81milliGRAM(s) Oral daily  insulin lispro (HumaLOG) corrective regimen sliding scale  SubCutaneous three times a day before meals  dextrose 5%. 1000milliLiter(s) IV Continuous <Continuous>  dextrose 50% Injectable 12.5Gram(s) IV Push once  dexamethasone  Injectable 6milliGRAM(s) IV Push every 6 hours  atorvastatin 40milliGRAM(s) Oral at bedtime  famotidine    Tablet 20milliGRAM(s) Oral two times a day  folic acid 1milliGRAM(s) Oral daily  pregabalin 75milliGRAM(s) Oral two times a day  DULoxetine 30milliGRAM(s) Oral two times a day  levETIRAcetam 1500milliGRAM(s) Oral two times a day  senna 2Tablet(s) Oral at bedtime  trimethoprim  160 mG/sulfamethoxazole 800 mG 1Tablet(s) Oral <User Schedule>  ALPRAZolam 0.25milliGRAM(s) Oral two times a day  losartan 25milliGRAM(s) Oral daily  insulin glargine Injectable (LANTUS) 30Unit(s) SubCutaneous at bedtime  insulin lispro Injectable (HumaLOG) 10Unit(s) SubCutaneous three times a day before meals    MEDICATIONS  (PRN):  dextrose Gel 1Dose(s) Oral once PRN Blood Glucose LESS THAN 70 milliGRAM(s)/deciliter  glucagon  Injectable 1milliGRAM(s) IntraMuscular once PRN Glucose LESS THAN 70 milligrams/deciliter  docusate sodium 100milliGRAM(s) Oral two times a day PRN Constipation  labetalol Injectable 10milliGRAM(s) IV Push every 4 hours PRN SBP>180, DBP>95  hydrALAZINE 10milliGRAM(s) Oral every 3 hours PRN Systolic/Diastolic >  oxyCODONE  5 mG/acetaminophen 325 mG 1Tablet(s) Oral every 6 hours PRN Severe Pain (7 - 10)  acetaminophen   Tablet. 650milliGRAM(s) Oral every 6 hours PRN Mild Pain (1 - 3)      Allergies    No Known Allergies    Intolerances    Karnofsky Performance Score/Palliative Performance Status Version 2:  30   %    Vital Signs Last 24 Hrs  T(C): 36.4, Max: 36.4 (04-26 @ 00:51)  T(F): 97.5, Max: 97.5 (04-26 @ 00:51)  HR: 61 (61 - 97)  BP: 163/89 (135/85 - 182/103)  BP(mean): --  RR: 20 (18 - 22)  SpO2: 100% (97% - 100%)    PHYSICAL EXAM:    General: [x ] alert  [ ] oriented x ____ [ ] lethargic [ ] agitated                  [ ] cachexia  [ ] nonverbal  [ ] coma    HEENT: [x ] non icteric  [ ] dry mouth  [ ] ET tube/trach    Lungs: [x ] comfortable [ ] tachypnea/labored breathing  [ ] excessive secretions    CV: [ x] normal  [ ] tachycardia    GI: [ x] normal  [ ] distended  [ ] tender  [ ] no BS               [ ] PEG/NG/OG tube    : [x ] normal  [ ] incontinent  [ ] oliguria/anuria  [ ] dia    MSK: [ ] normal  [x ] weakness  [ ] edema             [ ] ambulatory  [ ] bedbound/wheelchair bound    Skin: [ ] normal  [ ] pressure ulcers- Stage_____  [x ] no rash    LABS:              I&O's Summary      RADIOLOGY & ADDITIONAL STUDIES:    ASSESSMENT and PLAN:    ADVANCE DIRECTIVES:  [ ] YES [ ] NO    DNR: [ ] YES [ ] NO      Date Completed:                    MOLST [ ] YES [ ] NO   Date Completed:     COUNSELING:  Advanced Care Planning Discussion - Time Spent ______Minutes.   More than 50% time spent in counseling and coordinating care. ______ Minutes.     Thank you for the opportunity to assist with the care of this patient.   [facility] [phone].

## 2017-04-26 NOTE — PROGRESS NOTE ADULT - SUBJECTIVE AND OBJECTIVE BOX
HOSPITALIST PROGRESS NOTE    NEMESIO OSBORNE  [id number]  62yMale    Patient is a 62y old  Male who presents with a chief complaint of slurred speech, blurred vision (25 Apr 2017 21:17)      SUBJECTIVE:   Chart reviewed since last visit.  Patient seen and examined at bedside earlier today for acute stroke, progressive GBM, uncal herniation.  Patient with aphasia, responds with yes to questioning.   Unable to gauge       OBJECTIVE:  Vital Signs Last 24 Hrs  T(C): 37.1, Max: 37.1 (04-26 @ 15:20)  T(F): 98.8, Max: 98.8 (04-26 @ 15:20)  HR: 87 (61 - 92)  BP: 167/105 (135/85 - 167/105)  RR: 18 (18 - 20)  SpO2: 97% (97% - 100%)    PHYSICAL EXAMINATION  General: NAD[]   nontoxic[]   ill-appearing[]  Obese[]  HEENT: Scar left temporoparietal scalp.   NECK: Supple[+]  JVD[] Carotid bruit[]  CVS: RRR[+]  Irregular[]  S1+S2[+]   Murmur[]  RESP: Fair air entry bilaterally[+]   Clear sounds[]   poor effort []  wheeze[-]   Crackles[-]  GI: Soft[+]  Nondistended[+]   Nontender[+]   Bowel Sounds[+]  Mass[]   HSM[]   Ascites[]  : suprapubic tenderness[-]   CVA Tenderness[]   Pierre[]  MS: FROM[+]   Edema[+]  CNS: NIH - 17. Right extremities 0/5.   INTEG: Skin is Warm[+]  dry[] Lesion[] Decubitus[]  PSYCH: Fair mood    MONITOR:  CAPILLARY BLOOD GLUCOSE  253 (26 Apr 2017 16:49)  300 (26 Apr 2017 12:18)  292 (26 Apr 2017 07:11)  268 (25 Apr 2017 20:50)        I&O's Summary              MEDICATIONS  (STANDING):  aspirin enteric coated 81milliGRAM(s) Oral daily  insulin lispro (HumaLOG) corrective regimen sliding scale  SubCutaneous three times a day before meals  dextrose 5%. 1000milliLiter(s) IV Continuous <Continuous>  dextrose 50% Injectable 12.5Gram(s) IV Push once  dexamethasone  Injectable 6milliGRAM(s) IV Push every 6 hours  atorvastatin 40milliGRAM(s) Oral at bedtime  famotidine    Tablet 20milliGRAM(s) Oral two times a day  folic acid 1milliGRAM(s) Oral daily  pregabalin 75milliGRAM(s) Oral two times a day  DULoxetine 30milliGRAM(s) Oral two times a day  levETIRAcetam 1500milliGRAM(s) Oral two times a day  senna 2Tablet(s) Oral at bedtime  trimethoprim  160 mG/sulfamethoxazole 800 mG 1Tablet(s) Oral <User Schedule>  ALPRAZolam 0.25milliGRAM(s) Oral two times a day  losartan 25milliGRAM(s) Oral daily  insulin glargine Injectable (LANTUS) 30Unit(s) SubCutaneous at bedtime  insulin lispro Injectable (HumaLOG) 10Unit(s) SubCutaneous three times a day before meals      MEDICATIONS  (PRN):  dextrose Gel 1Dose(s) Oral once PRN Blood Glucose LESS THAN 70 milliGRAM(s)/deciliter  glucagon  Injectable 1milliGRAM(s) IntraMuscular once PRN Glucose LESS THAN 70 milligrams/deciliter  docusate sodium 100milliGRAM(s) Oral two times a day PRN Constipation  labetalol Injectable 10milliGRAM(s) IV Push every 4 hours PRN SBP>180, DBP>95  hydrALAZINE 10milliGRAM(s) Oral every 3 hours PRN Systolic/Diastolic >  oxyCODONE  5 mG/acetaminophen 325 mG 1Tablet(s) Oral every 6 hours PRN Severe Pain (7 - 10)  acetaminophen   Tablet. 650milliGRAM(s) Oral every 6 hours PRN Mild Pain (1 - 3)

## 2017-04-26 NOTE — PROGRESS NOTE ADULT - ASSESSMENT
62 year old male with known GBM now presenting with new right extremity hemiplegia secondary to tumour and edema vs stroke. He does have uncal herniation which is chronic though worse from before, though no midline shift is noted on CTH. He does have moderately severe asphasia which is present from before. No change in patient status since admission. He is awaiting to go home with [facility].  DMT2 with hyperglycemia exacerbated by steroids. Improved glycemic control  HTN, improved.    Poor prognosis

## 2017-04-26 NOTE — PROGRESS NOTE ADULT - ATTENDING COMMENTS
Awaiting hospice equipment at home prior to discharge.  Likely in next 24 hours
Discussed with Dr Silverio, patients primary Neurologist at [facility].  Patient not responding to treatment. had stroke recently.  He states patient is hospice appropriate.  Discussed with brother Jimbo who is agreeable
Unchanged clinically.   Awaiting imaging.  Reconciled medications.

## 2017-04-26 NOTE — PROGRESS NOTE ADULT - PROBLEM SELECTOR PROBLEM 3
Type 2 diabetes mellitus without complication, with long-term current use of insulin Essential hypertension

## 2017-04-27 VITALS
DIASTOLIC BLOOD PRESSURE: 84 MMHG | OXYGEN SATURATION: 96 % | SYSTOLIC BLOOD PRESSURE: 152 MMHG | TEMPERATURE: 98 F | RESPIRATION RATE: 18 BRPM | HEART RATE: 85 BPM

## 2017-04-27 DIAGNOSIS — C71.9 MALIGNANT NEOPLASM OF BRAIN, UNSPECIFIED: ICD-10-CM

## 2017-04-27 PROCEDURE — 99233 SBSQ HOSP IP/OBS HIGH 50: CPT

## 2017-04-27 PROCEDURE — 99239 HOSP IP/OBS DSCHRG MGMT >30: CPT

## 2017-04-27 RX ORDER — OXYCODONE HYDROCHLORIDE 5 MG/1
1 TABLET ORAL
Qty: 120 | Refills: 0 | OUTPATIENT
Start: 2017-04-27 | End: 2017-05-27

## 2017-04-27 RX ORDER — DULOXETINE HYDROCHLORIDE 30 MG/1
1 CAPSULE, DELAYED RELEASE ORAL
Qty: 0 | Refills: 0 | COMMUNITY

## 2017-04-27 RX ORDER — SENNA PLUS 8.6 MG/1
2 TABLET ORAL
Qty: 60 | Refills: 0 | OUTPATIENT
Start: 2017-04-27 | End: 2017-05-27

## 2017-04-27 RX ORDER — ALPRAZOLAM 0.25 MG
1 TABLET ORAL
Qty: 60 | Refills: 0 | OUTPATIENT
Start: 2017-04-27 | End: 2017-05-27

## 2017-04-27 RX ORDER — DOCUSATE SODIUM 100 MG
1 CAPSULE ORAL
Qty: 60 | Refills: 0 | OUTPATIENT
Start: 2017-04-27 | End: 2017-05-27

## 2017-04-27 RX ORDER — DULOXETINE HYDROCHLORIDE 30 MG/1
1 CAPSULE, DELAYED RELEASE ORAL
Qty: 60 | Refills: 0 | OUTPATIENT
Start: 2017-04-27 | End: 2017-05-27

## 2017-04-27 RX ORDER — ENOXAPARIN SODIUM 100 MG/ML
0 INJECTION SUBCUTANEOUS
Qty: 0 | Refills: 0 | COMMUNITY

## 2017-04-27 RX ORDER — DEXAMETHASONE 0.5 MG/5ML
1 ELIXIR ORAL
Qty: 50 | Refills: 0 | OUTPATIENT
Start: 2017-04-27 | End: 2017-05-04

## 2017-04-27 RX ORDER — ACETAMINOPHEN 500 MG
2 TABLET ORAL
Qty: 0 | Refills: 0 | COMMUNITY

## 2017-04-27 RX ORDER — SENNA PLUS 8.6 MG/1
2 TABLET ORAL
Qty: 0 | Refills: 0 | COMMUNITY

## 2017-04-27 RX ORDER — DOCUSATE SODIUM 100 MG
1 CAPSULE ORAL
Qty: 0 | Refills: 0 | COMMUNITY

## 2017-04-27 RX ORDER — ACETAMINOPHEN 500 MG
2 TABLET ORAL
Qty: 0 | Refills: 0 | COMMUNITY
Start: 2017-04-27

## 2017-04-27 RX ADMIN — DULOXETINE HYDROCHLORIDE 30 MILLIGRAM(S): 30 CAPSULE, DELAYED RELEASE ORAL at 17:47

## 2017-04-27 RX ADMIN — Medication 6 MILLIGRAM(S): at 17:48

## 2017-04-27 RX ADMIN — Medication 0.25 MILLIGRAM(S): at 17:47

## 2017-04-27 RX ADMIN — LOSARTAN POTASSIUM 25 MILLIGRAM(S): 100 TABLET, FILM COATED ORAL at 06:06

## 2017-04-27 RX ADMIN — Medication: at 17:52

## 2017-04-27 RX ADMIN — Medication 81 MILLIGRAM(S): at 11:47

## 2017-04-27 RX ADMIN — Medication 10 UNIT(S): at 11:46

## 2017-04-27 RX ADMIN — DULOXETINE HYDROCHLORIDE 30 MILLIGRAM(S): 30 CAPSULE, DELAYED RELEASE ORAL at 06:05

## 2017-04-27 RX ADMIN — Medication 1 MILLIGRAM(S): at 11:46

## 2017-04-27 RX ADMIN — Medication 0.25 MILLIGRAM(S): at 06:05

## 2017-04-27 RX ADMIN — FAMOTIDINE 20 MILLIGRAM(S): 10 INJECTION INTRAVENOUS at 17:47

## 2017-04-27 RX ADMIN — Medication 6: at 14:19

## 2017-04-27 RX ADMIN — Medication 6 MILLIGRAM(S): at 06:06

## 2017-04-27 RX ADMIN — Medication 75 MILLIGRAM(S): at 06:06

## 2017-04-27 RX ADMIN — Medication 10 UNIT(S): at 08:53

## 2017-04-27 RX ADMIN — LEVETIRACETAM 1500 MILLIGRAM(S): 250 TABLET, FILM COATED ORAL at 06:05

## 2017-04-27 RX ADMIN — Medication 75 MILLIGRAM(S): at 17:47

## 2017-04-27 RX ADMIN — LEVETIRACETAM 1500 MILLIGRAM(S): 250 TABLET, FILM COATED ORAL at 17:47

## 2017-04-27 RX ADMIN — FAMOTIDINE 20 MILLIGRAM(S): 10 INJECTION INTRAVENOUS at 06:05

## 2017-04-27 RX ADMIN — Medication: at 08:52

## 2017-04-27 RX ADMIN — Medication 10 UNIT(S): at 17:52

## 2017-04-27 RX ADMIN — Medication 6 MILLIGRAM(S): at 11:46

## 2017-04-27 NOTE — PROGRESS NOTE ADULT - PROBLEM SELECTOR PROBLEM 5
Constipation, unspecified constipation type Seizure 77 yo F with h/o HTN, HLD, hypothyroid, obese with chronic back pain p/w acutely worsened back and hip pain.

## 2017-04-27 NOTE — OCCUPATIONAL THERAPY INITIAL EVALUATION ADULT - PERTINENT HX OF CURRENT PROBLEM, REHAB EVAL
Left sided GBM s/p sx XRT and chemotherapy recently started on Avastin s/p acute CVA left corona radiara/basil ganglia

## 2017-04-27 NOTE — DISCHARGE NOTE ADULT - HOSPITAL COURSE
62 year old male with known GBM now presenting with new right extremity hemiplegia secondary to tumour and edema vs stroke. He does have uncal herniation which is chronic though worse from before, though no midline shift is noted on CTH. He does have moderately severe asphasia which is present from before. MRA confirmed acute MCA stroke with MRA showing Left MCA occlusion. No change in patient right hemiplegia since admission.  DMT2 with hyperglycemia exacerbated by steroids. Improved glycemic control.  Patient and family aware of poor prognosis.  He is awaiting to go home with [facility].    Medications e-scribed to [facility] for hospice.  Other medication described to patients regular pharmacy.    Patient brother Jimbo has been regularly updated.    Primary Neurologist Dr Silverio has also been intimated and agrees with hospice care.    Discharge time - 39 minutes

## 2017-04-27 NOTE — PHYSICAL THERAPY INITIAL EVALUATION ADULT - CRITERIA FOR SKILLED THERAPEUTIC INTERVENTIONS
impairments found/risk reduction/prevention/rehab potential/anticipated equipment needs at discharge/anticipated discharge recommendation/therapy frequency/functional limitations in following categories

## 2017-04-27 NOTE — PROGRESS NOTE ADULT - SUBJECTIVE AND OBJECTIVE BOX
INTERVAL HPI/OVERNIGHT EVENTS:  no overnight events    PRESENT SYMPTOMS: SOURCE:  Patient/Family/Team    PAIN SCALE:  0 = none  1 = mild   2 = moderate  3 = severe    Pain: 0    Dyspnea:  [ ] YES [x ] NO  Anxiety:  [x] YES [ ] NO  Fatigue: [x ] YES [ ] NO  Nausea: [ ] YES [ x] NO  Loss of Appetite: [x ] YES [ ] NO  Other symptoms: __________    MEDICATIONS  (STANDING):  aspirin enteric coated 81milliGRAM(s) Oral daily  insulin lispro (HumaLOG) corrective regimen sliding scale  SubCutaneous three times a day before meals  dextrose 5%. 1000milliLiter(s) IV Continuous <Continuous>  dextrose 50% Injectable 12.5Gram(s) IV Push once  dexamethasone  Injectable 6milliGRAM(s) IV Push every 6 hours  atorvastatin 40milliGRAM(s) Oral at bedtime  famotidine    Tablet 20milliGRAM(s) Oral two times a day  folic acid 1milliGRAM(s) Oral daily  pregabalin 75milliGRAM(s) Oral two times a day  DULoxetine 30milliGRAM(s) Oral two times a day  levETIRAcetam 1500milliGRAM(s) Oral two times a day  senna 2Tablet(s) Oral at bedtime  trimethoprim  160 mG/sulfamethoxazole 800 mG 1Tablet(s) Oral <User Schedule>  ALPRAZolam 0.25milliGRAM(s) Oral two times a day  losartan 25milliGRAM(s) Oral daily  insulin glargine Injectable (LANTUS) 30Unit(s) SubCutaneous at bedtime  insulin lispro Injectable (HumaLOG) 10Unit(s) SubCutaneous three times a day before meals    MEDICATIONS  (PRN):  dextrose Gel 1Dose(s) Oral once PRN Blood Glucose LESS THAN 70 milliGRAM(s)/deciliter  glucagon  Injectable 1milliGRAM(s) IntraMuscular once PRN Glucose LESS THAN 70 milligrams/deciliter  docusate sodium 100milliGRAM(s) Oral two times a day PRN Constipation  labetalol Injectable 10milliGRAM(s) IV Push every 4 hours PRN SBP>180, DBP>95  hydrALAZINE 10milliGRAM(s) Oral every 3 hours PRN Systolic/Diastolic >  oxyCODONE  5 mG/acetaminophen 325 mG 1Tablet(s) Oral every 6 hours PRN Severe Pain (7 - 10)  acetaminophen   Tablet. 650milliGRAM(s) Oral every 6 hours PRN Mild Pain (1 - 3)      Allergies    No Known Allergies    Intolerances    Karnofsky Performance Score/Palliative Performance Status Version 2:   30  %    Vital Signs Last 24 Hrs  T(C): 36.3, Max: 37.1 (04-26 @ 15:20)  T(F): 97.3, Max: 98.8 (04-26 @ 15:20)  HR: 60 (60 - 87)  BP: 157/96 (157/96 - 170/100)  BP(mean): --  RR: 18 (18 - 18)  SpO2: 98% (97% - 98%)    PHYSICAL EXAM:    General: [x ] alert  [ ] oriented x ____ [ ] lethargic [ ] agitated                  [ ] cachexia  [ ] nonverbal  [ ] coma    HEENT: [x ] left eye patch,  right eye non icteric.  [ ] dry mouth  [ ] ET tube/trach    Lungs: [ x] comfortable [ ] tachypnea/labored breathing  [ ] excessive secretions    CV: [ x] normal  [ ] tachycardia    GI: [x ] normal  [ ] distended  [ ] tender  [ ] no BS               [ ] PEG/NG/OG tube    : [x ] normal  [ ] incontinent  [ ] oliguria/anuria  [ ] dia    MSK: [ ] normal  [ x] weakness  [ ] edema             [ ] ambulatory  [ ] bedbound/wheelchair bound    Skin: [ ] normal  [ ] pressure ulcers- Stage_____  [x ] no rash      Thank you for the opportunity to assist with the care of this patient.   [facility] [phone].

## 2017-04-27 NOTE — DISCHARGE NOTE ADULT - SECONDARY DIAGNOSIS.
Glioblastoma Cerebral edema Uncal herniation Right hemiplegia Gastroesophageal reflux disease without esophagitis Seizure

## 2017-04-27 NOTE — PHYSICAL THERAPY INITIAL EVALUATION ADULT - PERTINENT HX OF CURRENT PROBLEM, REHAB EVAL
62 year old male with PMHx significant for left sided GBM s/p surgery, XRT and chemotherapy recently started on Avastin was found by family to be unable to move right arm in Rehab today. s/p acute CVA of left corona radiata/basal ganglia

## 2017-04-27 NOTE — DISCHARGE NOTE ADULT - CARE PLAN
Principal Discharge DX:	Cerebrovascular accident (CVA) due to other mechanism  Goal:	Comfort care  Instructions for follow-up, activity and diet:	comfort care  Secondary Diagnosis:	Glioblastoma  Secondary Diagnosis:	Cerebral edema  Secondary Diagnosis:	Uncal herniation  Secondary Diagnosis:	Right hemiplegia  Secondary Diagnosis:	Gastroesophageal reflux disease without esophagitis  Secondary Diagnosis:	Seizure

## 2017-04-27 NOTE — PROGRESS NOTE ADULT - PROBLEM SELECTOR PLAN 4
Patient to be d/c to his brother's house with home hospice services.  NICOLLE completed. DNR/DNI. Pscyhosocial support.

## 2017-04-27 NOTE — PHYSICAL THERAPY INITIAL EVALUATION ADULT - ADDITIONAL COMMENTS
pt with GBM and came from [facility]. prior mobility status unknown and pt unable to state due to ?aphasia. states his brother will be caring for him at home.

## 2017-04-27 NOTE — DISCHARGE NOTE ADULT - PATIENT PORTAL LINK FT
“You can access the Follow[facility] Patient Portal, offered by [facility], by registering with the following website: [email or web address]”

## 2017-04-27 NOTE — PHYSICAL THERAPY INITIAL EVALUATION ADULT - ACTIVE RANGE OF MOTION EXAMINATION, REHAB EVAL
right side-see MMT below/Left UE Active ROM was WNL (within normal limits)/LLE Active ROM was WNL (within normal limits)

## 2017-04-27 NOTE — DISCHARGE NOTE ADULT - MEDICATION SUMMARY - MEDICATIONS TO CHANGE
I will SWITCH the dose or number of times a day I take the medications listed below when I get home from the hospital:    insulin glargine 100 units/mL subcutaneous solution  -- 24 unit(s) subcutaneous once a day (at bedtime)    insulin  -- 5 unit(s) subcutaneous 3 times a day novolog  -- Lispro    DULoxetine 30 mg oral delayed release capsule  -- 60 milligram(s) by mouth once a day    dexamethasone 4 mg oral tablet  -- 1 tab(s) by mouth every 12 hours    insulin glargine  -- 24 unit(s) subcutaneous once a day (at bedtime)    Keppra 1000 mg oral tablet  -- 1.5 tab(s) by mouth 2 times a day

## 2017-04-27 NOTE — PROGRESS NOTE ADULT - ASSESSMENT
62 year old male with known GBM now presenting with new right extremity hemiplegia secondary to tumour and edema vs stroke. He does have uncal herniation which is chronic though worse from before, though no midline shift is noted on CTH. He does have moderately severe asphasia which is present from before. No change in patient status since admission. He is awaiting to go home with [facility].  DMT2 with hyperglycemia exacerbated by steroids. Improved glycemic control  HTN, improved.    Poor prognosis. Family aware that patient may die at any time if uncus herniates.

## 2017-04-27 NOTE — PROGRESS NOTE ADULT - ASSESSMENT
62yr man with GBM s/p resection, radiation and chemotherapy and recently placed on Avastin. Patient with continued progression of disease, worsening mass affect, right hemiparesis, aphasic.  Poor prognosis. Hospice appropriate

## 2017-04-27 NOTE — DISCHARGE NOTE ADULT - MEDICATION SUMMARY - MEDICATIONS TO TAKE
I will START or STAY ON the medications listed below when I get home from the hospital:    dexamethasone 4 mg oral tablet  -- 1 tab(s) by mouth 4 times a day x 1 week then  1 tab(s) by mouth 3 times a day x 1 week then  1 tab(s) by mouth 2 times a day x 1 week          -- Indication: For Brain edema    acetaminophen 325 mg oral tablet  -- 2 tab(s) by mouth every 6 hours, As needed, Mild Pain (1 - 3)  -- Indication: For Pain or fever as needed    naproxen 250 mg oral tablet  -- 1 tab(s) by mouth 2 times a day, As Needed  -- Indication: For Pain    aspirin 81 mg oral tablet  -- 1 tab(s) by mouth once a day  -- Indication: For Stroke    acetaminophen-oxycodone 325 mg-5 mg oral tablet  -- 1 tab(s) by mouth every 6 hours, As needed, Severe Pain (7 - 10) MDD:8  -- Indication: For Pain    losartan 25 mg oral tablet  -- 1 tab(s) by mouth once a day  -- Do not take this drug if you are pregnant.  It is very important that you take or use this exactly as directed.  Do not skip doses or discontinue unless directed by your doctor.  Some non-prescription drugs may aggravate your condition.  Read all labels carefully.  If a warning appears, check with your doctor before taking.    -- Indication: For Hypertension    Keppra 1000 mg oral tablet  -- 1.5 tab(s) by mouth 2 times a day  -- Indication: For Seizure    pregabalin 75 mg oral capsule  -- 1 cap(s) by mouth 2 times a day MDD:2  -- Indication: For Neuropathy    DULoxetine 30 mg oral delayed release capsule  -- 1 cap(s) by mouth 2 times a day  -- Indication: For Neuropathy, depression    insulin glargine 100 units/mL subcutaneous solution  -- 32 unit(s) subcutaneous once a day (at bedtime)  -- Indication: For Diabetes    insulin  -- 10 unit(s) subcutaneous 3 times a day  -- Lispro  -- Indication: For Diabetes    simvastatin 40 mg oral tablet  -- 1 tab(s) by mouth once a day (at bedtime)  -- Indication: For Cholesterol    ALPRAZolam 0.25 mg oral tablet  -- 1 tab(s) by mouth 2 times a day MDD:2  -- Indication: For Ansiety    famotidine 20 mg oral tablet  -- 1 tab(s) by mouth 2 times a day  -- Indication: For Prevent ulcer    Innerclean oral tablet  -- 2 tab(s) by mouth once a day (at bedtime)  -- Indication: For Constipation, unspecified constipation type    Colace 100 mg oral capsule  -- 1 cap(s) by mouth 2 times a day  -- Indication: For Constipation, unspecified constipation type    Bactrim  mg-160 mg oral tablet  --  by mouth on Monday, Wednesday, Friday  -- Indication: For Prevent infection    melatonin 3 mg oral tablet  -- 1 tab(s) by mouth once (at bedtime)  -- Indication: For Sleep    folic acid 1 mg oral tablet  --  by mouth once a day  -- Indication: For Supplement

## 2017-04-27 NOTE — PROGRESS NOTE ADULT - PROBLEM SELECTOR PLAN 1
Continue ASA, Statin  Home Hospice today  DNR
Discontinue stroke protocol  Continue ASA, Statin  Family in talks with Hospice for home  DNR
Discontinue stroke protocol  Continue ASA, Statin  Family in talks with Hospice for home  DNR
Accepted by [facility] for home hospice services
Cont Decadron and Keppra  Tylenol and Percocet for pain prn
Continue Decadron and Keppra.   Tylenol and Percocet PRN for pain.
NIH=17  bed assignment to stroke unit  Neurochecks  Keep -170  MRI -- will need to F/U w/pt's Neurologist  as soon as results available  MRA-- will need to F/U w/pt's Neurologist  as soon as results available  Neurology consult appreciated  Neurosurgery (not a surgical candidate )  ICU consult appreciated  ASA 81mg PO QD  Lipitor 40mg PO QHS  Swallow evaluation

## 2017-04-27 NOTE — PROGRESS NOTE ADULT - PROBLEM SELECTOR PROBLEM 1
Cerebrovascular accident (CVA) due to other mechanism
Brain tumor
Glioblastoma
Glioblastoma
Cerebrovascular accident (CVA) due to other mechanism

## 2017-04-27 NOTE — DISCHARGE NOTE ADULT - MEDICATION SUMMARY - MEDICATIONS TO STOP TAKING
I will STOP taking the medications listed below when I get home from the hospital:    atorvastatin 40 mg oral tablet  -- 1 tab(s) by mouth once a day (at bedtime)

## 2017-04-27 NOTE — PHYSICAL THERAPY INITIAL EVALUATION ADULT - PASSIVE RANGE OF MOTION EXAMINATION, REHAB EVAL
bilateral lower extremity Passive ROM was WNL/bilateral upper extremity Passive ROM was WNL (within normal limits)

## 2017-04-27 NOTE — OCCUPATIONAL THERAPY INITIAL EVALUATION ADULT - RANGE OF MOTION EXAMINATION, UPPER EXTREMITY
Right UE flaccid/Right UE Passive ROM was WNL (within normal limits)/Left UE Active ROM was WFL (within functional limits)

## 2017-04-27 NOTE — PROGRESS NOTE ADULT - SUBJECTIVE AND OBJECTIVE BOX
HOSPITALIST PROGRESS NOTE    NEMESIO OSBORNE  [id number]  62yMale    Patient is a 62y old  Male who presents with a chief complaint of slurred speech, blurred vision (27 Apr 2017 13:37)      SUBJECTIVE:   Chart reviewed since last visit.  Patient seen and examined at bedside earlier today.  Unable to move right arm and leg.  Denies any headache.        OBJECTIVE:  Vital Signs Last 24 Hrs  T(C): 36.8, Max: 36.8 (04-26 @ 23:19)  T(F): 98.2, Max: 98.3 (04-26 @ 23:19)  HR: 96 (60 - 96)  BP: 156/98 (156/98 - 170/100)  RR: 18 (18 - 18)  SpO2: 95% (95% - 98%)    PHYSICAL EXAMINATION  General: NAD[+]     HEENT: Scar left temporoparietal scalp.   NECK: Supple[+]  JVD[] Carotid bruit[]  CVS: RRR[+] S1+S2[+]   Murmur[]  RESP: Fair air entry bilaterally[+]    wheeze[-]   Crackles[-]  GI: Soft[+]  Nondistended[+]   Nontender[+]   Bowel Sounds[+]    : suprapubic tenderness[-]    MS: FROM[+]   Edema[+]  CNS: NIH - 17. Right extremities 0/5.   INTEG: Skin is Warm[+]    PSYCH: Fair mood    MONITOR:  CAPILLARY BLOOD GLUCOSE  317 (27 Apr 2017 08:11)  175 (26 Apr 2017 22:13)          MEDICATIONS  (STANDING):  aspirin enteric coated 81milliGRAM(s) Oral daily  insulin lispro (HumaLOG) corrective regimen sliding scale  SubCutaneous three times a day before meals  dextrose 5%. 1000milliLiter(s) IV Continuous <Continuous>  dextrose 50% Injectable 12.5Gram(s) IV Push once  dexamethasone  Injectable 6milliGRAM(s) IV Push every 6 hours  atorvastatin 40milliGRAM(s) Oral at bedtime  famotidine    Tablet 20milliGRAM(s) Oral two times a day  folic acid 1milliGRAM(s) Oral daily  pregabalin 75milliGRAM(s) Oral two times a day  DULoxetine 30milliGRAM(s) Oral two times a day  levETIRAcetam 1500milliGRAM(s) Oral two times a day  senna 2Tablet(s) Oral at bedtime  trimethoprim  160 mG/sulfamethoxazole 800 mG 1Tablet(s) Oral <User Schedule>  ALPRAZolam 0.25milliGRAM(s) Oral two times a day  losartan 25milliGRAM(s) Oral daily  insulin glargine Injectable (LANTUS) 30Unit(s) SubCutaneous at bedtime  insulin lispro Injectable (HumaLOG) 10Unit(s) SubCutaneous three times a day before meals      MEDICATIONS  (PRN):  dextrose Gel 1Dose(s) Oral once PRN Blood Glucose LESS THAN 70 milliGRAM(s)/deciliter  glucagon  Injectable 1milliGRAM(s) IntraMuscular once PRN Glucose LESS THAN 70 milligrams/deciliter  docusate sodium 100milliGRAM(s) Oral two times a day PRN Constipation  labetalol Injectable 10milliGRAM(s) IV Push every 4 hours PRN SBP>180, DBP>95  hydrALAZINE 10milliGRAM(s) Oral every 3 hours PRN Systolic/Diastolic >  oxyCODONE  5 mG/acetaminophen 325 mG 1Tablet(s) Oral every 6 hours PRN Severe Pain (7 - 10)  acetaminophen   Tablet. 650milliGRAM(s) Oral every 6 hours PRN Mild Pain (1 - 3) HOSPITALIST PROGRESS NOTE    NEMESIO OSBORNE  [id number]  62yMale    Patient is a 62y old  Male who presents with a chief complaint of slurred speech, blurred vision (27 Apr 2017 13:37)      SUBJECTIVE:   Chart reviewed since last visit.  Patient seen and examined at bedside earlier today for CVA, GBM  Unable to move right arm and leg.  Denies any headache.        OBJECTIVE:  Vital Signs Last 24 Hrs  T(C): 36.8, Max: 36.8 (04-26 @ 23:19)  T(F): 98.2, Max: 98.3 (04-26 @ 23:19)  HR: 96 (60 - 96)  BP: 156/98 (156/98 - 170/100)  RR: 18 (18 - 18)  SpO2: 95% (95% - 98%)    PHYSICAL EXAMINATION  General: NAD[+]     HEENT: Scar left temporoparietal scalp.   NECK: Supple[+]  JVD[] Carotid bruit[]  CVS: RRR[+] S1+S2[+]   Murmur[]  RESP: Fair air entry bilaterally[+]    wheeze[-]   Crackles[-]  GI: Soft[+]  Nondistended[+]   Nontender[+]   Bowel Sounds[+]    : suprapubic tenderness[-]    MS: FROM[+]   Edema[+]  CNS: NIH - 17. Right extremities 0/5.   INTEG: Skin is Warm[+]    PSYCH: Fair mood    MONITOR:  CAPILLARY BLOOD GLUCOSE  317 (27 Apr 2017 08:11)  175 (26 Apr 2017 22:13)          MEDICATIONS  (STANDING):  aspirin enteric coated 81milliGRAM(s) Oral daily  insulin lispro (HumaLOG) corrective regimen sliding scale  SubCutaneous three times a day before meals  dextrose 5%. 1000milliLiter(s) IV Continuous <Continuous>  dextrose 50% Injectable 12.5Gram(s) IV Push once  dexamethasone  Injectable 6milliGRAM(s) IV Push every 6 hours  atorvastatin 40milliGRAM(s) Oral at bedtime  famotidine    Tablet 20milliGRAM(s) Oral two times a day  folic acid 1milliGRAM(s) Oral daily  pregabalin 75milliGRAM(s) Oral two times a day  DULoxetine 30milliGRAM(s) Oral two times a day  levETIRAcetam 1500milliGRAM(s) Oral two times a day  senna 2Tablet(s) Oral at bedtime  trimethoprim  160 mG/sulfamethoxazole 800 mG 1Tablet(s) Oral <User Schedule>  ALPRAZolam 0.25milliGRAM(s) Oral two times a day  losartan 25milliGRAM(s) Oral daily  insulin glargine Injectable (LANTUS) 30Unit(s) SubCutaneous at bedtime  insulin lispro Injectable (HumaLOG) 10Unit(s) SubCutaneous three times a day before meals      MEDICATIONS  (PRN):  dextrose Gel 1Dose(s) Oral once PRN Blood Glucose LESS THAN 70 milliGRAM(s)/deciliter  glucagon  Injectable 1milliGRAM(s) IntraMuscular once PRN Glucose LESS THAN 70 milligrams/deciliter  docusate sodium 100milliGRAM(s) Oral two times a day PRN Constipation  labetalol Injectable 10milliGRAM(s) IV Push every 4 hours PRN SBP>180, DBP>95  hydrALAZINE 10milliGRAM(s) Oral every 3 hours PRN Systolic/Diastolic >  oxyCODONE  5 mG/acetaminophen 325 mG 1Tablet(s) Oral every 6 hours PRN Severe Pain (7 - 10)  acetaminophen   Tablet. 650milliGRAM(s) Oral every 6 hours PRN Mild Pain (1 - 3)

## 2017-05-02 PROCEDURE — 97163 PT EVAL HIGH COMPLEX 45 MIN: CPT

## 2017-05-02 PROCEDURE — 92610 EVALUATE SWALLOWING FUNCTION: CPT

## 2017-05-02 PROCEDURE — 70544 MR ANGIOGRAPHY HEAD W/O DYE: CPT

## 2017-05-02 PROCEDURE — 80053 COMPREHEN METABOLIC PANEL: CPT

## 2017-05-02 PROCEDURE — 96374 THER/PROPH/DIAG INJ IV PUSH: CPT

## 2017-05-02 PROCEDURE — 85610 PROTHROMBIN TIME: CPT

## 2017-05-02 PROCEDURE — 99285 EMERGENCY DEPT VISIT HI MDM: CPT | Mod: 25

## 2017-05-02 PROCEDURE — 36415 COLL VENOUS BLD VENIPUNCTURE: CPT

## 2017-05-02 PROCEDURE — 85027 COMPLETE CBC AUTOMATED: CPT

## 2017-05-02 PROCEDURE — 85730 THROMBOPLASTIN TIME PARTIAL: CPT

## 2017-05-02 PROCEDURE — 80048 BASIC METABOLIC PNL TOTAL CA: CPT

## 2017-05-02 PROCEDURE — 70551 MRI BRAIN STEM W/O DYE: CPT

## 2017-05-02 PROCEDURE — 70450 CT HEAD/BRAIN W/O DYE: CPT

## 2017-05-02 PROCEDURE — 97167 OT EVAL HIGH COMPLEX 60 MIN: CPT

## 2017-05-02 PROCEDURE — 80061 LIPID PANEL: CPT

## 2018-11-29 NOTE — DISCHARGE NOTE ADULT - PRINCIPAL DIAGNOSIS
DR Arturo Shrestha, please note Chriss Phillip CNP message. Patient declines to schedule Home sleep Test/ appt.  Thank you I attempted to call patient. VM is now full and unable to leave a message. I also attempted to call this before his appt (11/15/18) to see if he wanted to reschedule. Patient never returned my call. Noted.   Please make referring aware Patient called with message left for patient to call back to office. Patient declines to reschedule HST and f/u appointment. Patient did not show to discuss HST  with BALDEMAR Phillip on 11/19/18. Patient did not complete HST either. LOV   Assessment: 11/2/18      · Snoring  · Hypersomnia   · Fatigue  · Sleep onset and maintenance insomnia-rule out LYNNETTE, poor sleep hygiene, depression/anxiety likely contributing         Plan:      · HST to evaluate for sleep related breathing disorder. · Treatment options were discussed with patient if PSG reveals LYNNETTE, including CPAP therapy, oral appliances and upper airway surgery. · Sleep hygiene  · Cognitive behavioral therapy was discussed with patient including stimulus control and sleep restriction  · Follow-up with PCP for depression/anxiety  · No phone in bed, decrease time in bed, set bed/wake times  · Avoid sedatives, alcohol and caffeinated drinks at bed time. · No driving motorized vehicles or operating heavy machinery while fatigue, drowsy or sleepy. · Weight loss is also recommended as a long-term intervention. · Complications of LYNNETTE if not treated were discussed with patient patient to include systemic hypertension, pulmonary hypertension, cardiovascular morbidities, car accidents and all cause mortality. · Discussed pathophysiology of LYNNETTE with patient today- video shown in office.   · Patient education handout provided regarding sleep tips   · Patient declined flu shot today Please attempt to call x3 General TIA (transient ischemic attack)

## 2019-03-21 NOTE — PROGRESS NOTE ADULT - PROBLEM SELECTOR PLAN 7
Bilateral Helical Rim Advancement Flap Text: The defect edges were debeveled with a #15 blade scalpel.  Given the location of the defect and the proximity to free margins (helical rim) a bilateral helical rim advancement flap was deemed most appropriate.  Using a sterile surgical marker, the appropriate advancement flaps were drawn incorporating the defect and placing the expected incisions between the helical rim and antihelix where possible.  The area thus outlined was incised through and through with a #15 scalpel blade.  With a skin hook and iris scissors, the flaps were gently and sharply undermined and freed up. AQUILINOp-VCD  GIp- [id number]

## 2019-09-04 NOTE — ED PROVIDER NOTE - DURATION
yesterday impairments found/functional limitations in following categories/therapy frequency/anticipated equipment needs at discharge/anticipated discharge recommendation/rehab potential/predicted duration of therapy intervention/risk reduction/prevention

## 2019-09-04 NOTE — OCCUPATIONAL THERAPY INITIAL EVALUATION ADULT - BALANCE DISTURBANCE, IDENTIFIED IMPAIRMENT CONTRIBUTE, REHAB EVAL
Ideal body weight was utilized to determine the target body weight for ordered crystalloid fluids. impaired postural control/impaired motor control/decreased strength

## 2020-11-06 NOTE — PATIENT PROFILE ADULT. - NUTRITION PROFILE
Labs drawn (blue, green, gold and lavender tops) and sent to lab. Urine specimen cup given to pt.    no indicators present

## 2020-11-23 NOTE — PHYSICAL THERAPY INITIAL EVALUATION ADULT - PRECAUTIONS/LIMITATIONS, REHAB EVAL
The patient is a 49y Female complaining of hypertension. fall precautions/diplopia; has left eye patch/vision precautions

## 2022-01-01 NOTE — PROGRESS NOTE ADULT - SUBJECTIVE AND OBJECTIVE BOX
Patient is a 62y old  Male who presents with a chief complaint of slurred speech, blurred vision (18 Feb 2017 20:00)      HPI:  History obtained from sister. Pt is 61yo male with PMHx of DM, HTN, Opiod dependence, glioblastoma s/p resection at [facility] 2016, former smoker, presents with sudden onset of slurred speech, gait instability and right sided weakness. Symptoms lasted about 10-15 minutes, started to subside when EMS arrived. Pt denies syncope, falls, LOC, chest pain, sob, palpitations, med non compliance. No other constitutional symptoms. Still has residual Rt side weaknes with marked improvement in speech, and right sided strength. (18 Feb 2017 16:32).As per family he is suppose to restart on [place] and go for EEG in [facility]. Being followed by  recently had MRI brain unchanged.      PAST MEDICAL & SURGICAL HISTORY:  Glioblastoma  Essential hypertension  Diabetes mellitus of other type with hyperosmolarity, with long-term current use of insulin  Brain tumor  Brain tumor: brain tumor resection      FAMILY HISTORY:  No pertinent family history in first degree relatives      Social Hx:  Nonsmoker, no drug or alcohol use    MEDICATIONS  (STANDING):  atorvastatin 40milliGRAM(s) Oral at bedtime  aspirin enteric coated 325milliGRAM(s) Oral daily  insulin glargine Injectable (LANTUS) 24Unit(s) SubCutaneous at bedtime  insulin lispro (HumaLOG) corrective regimen sliding scale  SubCutaneous three times a day before meals  dextrose 5%. 1000milliLiter(s) IV Continuous <Continuous>  dextrose 50% Injectable 12.5Gram(s) IV Push once  dextrose 50% Injectable 25Gram(s) IV Push once  dextrose 50% Injectable 25Gram(s) IV Push once  levETIRAcetam 1000milliGRAM(s) Oral two times a day  DULoxetine 60milliGRAM(s) Oral daily  famotidine    Tablet 20milliGRAM(s) Oral two times a day  docusate sodium 100milliGRAM(s) Oral daily  folic acid 1milliGRAM(s) Oral daily  amLODIPine   Tablet 10milliGRAM(s) Oral daily       Allergies    No Known Allergies    Intolerances        ROS: Pertinent positives in HPI, all other ROS were reviewed and are negative.      Vital Signs Last 24 Hrs  T(C): 36.7, Max: 36.9 (02-18 @ 19:02)  T(F): 98, Max: 98.5 (02-18 @ 19:02)  HR: 74 (72 - 108)  BP: 106/87 (106/87 - 165/109)  BP(mean): --  RR: 17 (16 - 22)  SpO2: 90% (90% - 100%)        Constitutional: awake and alert.  HEENT: PERRLA, EOMI,   Neck: Supple.  Respiratory: Breath sounds are clear bilaterally  Cardiovascular: S1 and S2, regular / irregular rhythm  Gastrointestinal: soft, nontender  Extremities:  no edema  Vascular: Caritid Bruit - no  Musculoskeletal: no joint swelling/tenderness, no abnormal movements  Skin: No rashes    Neurological exam:  HF: A x O x 2. Appropriately interactive, normal affect. Speech with word finding difficulty ,with paraphasia . Naming /repetition affected   CN: MICHAEL, EOMI, VFF, facial sensation normal, Mild Rt facial tongue midline, Palate moves equally, SCM equal bilaterally  Motor: No pronator drift, Strength4/5 Rt side with Increased tone Arm and LE rest 5/5, normal bulk and tone, no tremor, rigidity or bradykinesia.    Sens: Intact to light touch / PP/ VS/ JS    Reflexes: Symmetric and normal . BJ 2+, BR 2+, KJ 2+, AJ 2+, downgoing toes Left withdrawing on Rt  Coord:  Rt side, dysmetria, MARGARET Decreased on Rt  Gait/Balance: Cannot test              Labs:   19 Feb 2017 06:32    143    |  104    |  23     ----------------------------<  136    3.4     |  31     |  1.02     Ca    8.4        19 Feb 2017 06:32    TPro  6.6    /  Alb  3.5    /  TBili  0.3    /  DBili  x      /  AST  29     /  ALT  64     /  AlkPhos  102    18 Feb 2017 14:03      02-19 SnicyqhmsoG1W 7.0                          14.8   12.6  )-----------( 139      ( 18 Feb 2017 14:03 )             41.8       Radiology:  - CT Head:  IMPRESSION:  2.6 cm low-density mass in theLEFT basal ganglia with mass   effect on the LEFT lateral ventricle. Encephalomalacia and gliosis is   seen in the LEFT temporal lobe with overlying craniotomy.   MRI with   gadolinium can be utilized for complete evaluation.      EXAM:  BRAIN (MRI) W WO CON                            PROCEDURE DATE:  02/19/2017        INTERPRETATION:      MR brain with and without gadolinium      CLINICAL INFORMATION:   Tumor recurrence      TECHNIQUE:   Sagittal and axial T1-weighted images, axial FLAIR images,   axial T2-weighted images, axial gradient echo images and axial diffusion   weighted images of the brain were obtained. Following 5.5 cc of Gadavist   administration, 2.0 cc discarded, isotropic volumetric and fast spin echo   T1-weighted images were obtained; this data was reformatted using image   post processing software in multiple imaging planes.          FINDINGS:   No prior similar studies are available for review.         The brain demonstrates the presence of a 3.5cm necrotic neoplasm within   the LEFT basal ganglia with extension of 5 cm enhancing neoplasm along   the anterior medial LEFT temporal lobe with associated dural enhancement.   There is moderate edema with out significant mass effect.   Encephalomalacia and gliosis is seen in the LEFT temporal lobe, sequela   of prior treatment. No acute cerebral cortical infarct is found.   No   intracranial hemorrhage is recognized.     The ventricles, sulci and basal cisterns appear unremarkable.    The vertebral and internal carotid arteries demonstrate expected flow   voids indicating their patency.         The orbits are unremarkable.  The paranasal sinuses are clear.  The nasal   cavity appears intact.  The nasopharynx is symmetric.  The central skull   base and petrous temporal bones are intact.  The calvarium shows prior   LEFT-sided craniotomy.      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   Moderate associated edema. No significant mass effect.        : No

## 2022-01-23 PROBLEM — C71.9 MALIGNANT NEOPLASM OF BRAIN, UNSPECIFIED: Chronic | Status: ACTIVE | Noted: 2017-02-07

## 2022-01-23 PROBLEM — K21.9 GASTRO-ESOPHAGEAL REFLUX DISEASE WITHOUT ESOPHAGITIS: Chronic | Status: ACTIVE | Noted: 2017-02-07

## 2022-01-23 PROBLEM — E13.00: Chronic | Status: ACTIVE | Noted: 2017-02-19

## 2022-01-23 PROBLEM — F32.9 MAJOR DEPRESSIVE DISORDER, SINGLE EPISODE, UNSPECIFIED: Chronic | Status: ACTIVE | Noted: 2017-02-07

## 2022-01-23 PROBLEM — C71.9 MALIGNANT NEOPLASM OF BRAIN, UNSPECIFIED: Chronic | Status: ACTIVE | Noted: 2017-02-19

## 2022-01-23 PROBLEM — I10 ESSENTIAL (PRIMARY) HYPERTENSION: Chronic | Status: ACTIVE | Noted: 2017-02-19

## 2022-01-23 PROBLEM — K59.00 CONSTIPATION, UNSPECIFIED: Chronic | Status: ACTIVE | Noted: 2017-02-07

## 2022-06-29 NOTE — H&P ADULT. - PMH
Astrocytoma brain tumor    Brain tumor    Cognitive impairment    Constipation    Depression    GERD (gastroesophageal reflux disease)    HLD (hyperlipidemia)    IDDM (insulin dependent diabetes mellitus)    Seizure Solaraze Pregnancy And Lactation Text: This medication is Pregnancy Category B and is considered safe. There is some data to suggest avoiding during the third trimester. It is unknown if this medication is excreted in breast milk.

## 2023-06-10 NOTE — PATIENT PROFILE ADULT. - NS PRO CONTRA FLU 1
Goal Outcome Evaluation:  diagnostic tests and consults completed and resulted  No  -vital signs normal or at patient baseline Yes    Orientation/Cognitive: A&OX2-3, redirectable.  Observation Goals (Met/ Not Met): Not met  Mobility Level/Assist Equipment: AX1 with GB and walker  Fall Risk (Y/N):  Yes  Behavior Concerns: None  Pain Management: Denies  Tele/VS/O2:  VSS on RA  ABNL Lab/BG:   Diet: Regular  Bowel/Bladder: Continent  Skin Concerns:  Bruising to the buttocks and BLE   Drains/Devices: PIV Sled  Tests/Procedures for next shift:  UA pending  Anticipated DC date & active delays: Possibly tomorrow back to the detention if OK with the facility  Patient Stated Goal for Today:  To rest                           yes

## 2023-09-16 NOTE — ED PROVIDER NOTE - CONSTITUTIONAL MANNER
BG goal 140 - 180     Discontinue IV insulin infusion protocol  Start Transition IV insulin infusion at 1.8 u/hr with stepdown parameters (0.8 u/kg dosing) Reduced current insulin infusion rate by 20%   Moderate Dose Correction Scale  BG monitoring q 4 hrs     ** Please call Endocrine for any BG related issues **    ** Please notify Endocrine for any change and/or advance in diet**    Discharge planning: TBD       appropriate for situation

## 2025-01-27 NOTE — ED ADULT TRIAGE NOTE - MODE OF ARRIVAL
EMS
No. TANESHA screening performed.  STOP BANG Legend: 0-2 = LOW Risk; 3-4 = INTERMEDIATE Risk; 5-8 = HIGH Risk

## 2025-06-10 NOTE — OCCUPATIONAL THERAPY INITIAL EVALUATION ADULT - LONG TERM MEMORY, REHAB EVAL
Spoke to pts mother via phone. Appt has been scheduled 06/16/25 at 11:20am with DR kinsey.   impaired